# Patient Record
Sex: MALE | Race: WHITE | Employment: OTHER | URBAN - METROPOLITAN AREA
[De-identification: names, ages, dates, MRNs, and addresses within clinical notes are randomized per-mention and may not be internally consistent; named-entity substitution may affect disease eponyms.]

---

## 2017-10-11 ENCOUNTER — TRANSFERRED RECORDS (OUTPATIENT)
Dept: HEALTH INFORMATION MANAGEMENT | Facility: CLINIC | Age: 82
End: 2017-10-11

## 2017-10-11 ENCOUNTER — APPOINTMENT (OUTPATIENT)
Dept: CT IMAGING | Facility: CLINIC | Age: 82
End: 2017-10-11
Attending: EMERGENCY MEDICINE
Payer: MEDICARE

## 2017-10-11 ENCOUNTER — HOSPITAL ENCOUNTER (EMERGENCY)
Facility: CLINIC | Age: 82
Discharge: HOME | End: 2017-10-11
Attending: EMERGENCY MEDICINE | Admitting: EMERGENCY MEDICINE
Payer: MEDICARE

## 2017-10-11 VITALS
DIASTOLIC BLOOD PRESSURE: 95 MMHG | BODY MASS INDEX: 27.17 KG/M2 | TEMPERATURE: 98.1 F | WEIGHT: 205 LBS | HEART RATE: 65 BPM | OXYGEN SATURATION: 97 % | RESPIRATION RATE: 15 BRPM | HEIGHT: 73 IN | SYSTOLIC BLOOD PRESSURE: 122 MMHG

## 2017-10-11 DIAGNOSIS — R55 NEAR SYNCOPE: ICD-10-CM

## 2017-10-11 LAB
ALBUMIN UR-MCNC: ABNORMAL MG/DL
ALBUMIN UR-MCNC: NEGATIVE MG/DL
ANION GAP SERPL CALCULATED.3IONS-SCNC: 6 MMOL/L (ref 3–14)
APPEARANCE UR: ABNORMAL
APPEARANCE UR: NORMAL
BASOPHILS # BLD AUTO: 0 10E9/L (ref 0–0.2)
BASOPHILS NFR BLD AUTO: 0.5 %
BILIRUB UR QL STRIP: ABNORMAL
BILIRUB UR QL STRIP: NEGATIVE
BUN SERPL-MCNC: 21 MG/DL (ref 7–30)
CALCIUM SERPL-MCNC: 8.7 MG/DL (ref 8.5–10.1)
CHLORIDE SERPL-SCNC: 99 MMOL/L (ref 94–109)
CO2 SERPL-SCNC: 27 MMOL/L (ref 20–32)
COLOR UR AUTO: ABNORMAL
COLOR UR AUTO: YELLOW
CREAT SERPL-MCNC: 1.01 MG/DL (ref 0.66–1.25)
DIFFERENTIAL METHOD BLD: NORMAL
EOSINOPHIL # BLD AUTO: 0.1 10E9/L (ref 0–0.7)
EOSINOPHIL NFR BLD AUTO: 1.2 %
ERYTHROCYTE [DISTWIDTH] IN BLOOD BY AUTOMATED COUNT: 12.6 % (ref 10–15)
GFR SERPL CREATININE-BSD FRML MDRD: 71 ML/MIN/1.7M2
GLUCOSE SERPL-MCNC: 86 MG/DL (ref 70–99)
GLUCOSE UR STRIP-MCNC: ABNORMAL MG/DL
GLUCOSE UR STRIP-MCNC: NEGATIVE MG/DL
HCT VFR BLD AUTO: 43.7 % (ref 40–53)
HGB BLD-MCNC: 15.2 G/DL (ref 13.3–17.7)
HGB UR QL STRIP: ABNORMAL
HGB UR QL STRIP: NEGATIVE
IMM GRANULOCYTES # BLD: 0 10E9/L (ref 0–0.4)
IMM GRANULOCYTES NFR BLD: 0.5 %
INTERPRETATION ECG - MUSE: NORMAL
KETONES UR STRIP-MCNC: ABNORMAL MG/DL
KETONES UR STRIP-MCNC: NEGATIVE MG/DL
LEUKOCYTE ESTERASE UR QL STRIP: ABNORMAL
LEUKOCYTE ESTERASE UR QL STRIP: NEGATIVE
LYMPHOCYTES # BLD AUTO: 1.5 10E9/L (ref 0.8–5.3)
LYMPHOCYTES NFR BLD AUTO: 25.6 %
MCH RBC QN AUTO: 32.9 PG (ref 26.5–33)
MCHC RBC AUTO-ENTMCNC: 34.8 G/DL (ref 31.5–36.5)
MCV RBC AUTO: 95 FL (ref 78–100)
MONOCYTES # BLD AUTO: 0.8 10E9/L (ref 0–1.3)
MONOCYTES NFR BLD AUTO: 13.8 %
NEUTROPHILS # BLD AUTO: 3.4 10E9/L (ref 1.6–8.3)
NEUTROPHILS NFR BLD AUTO: 58.4 %
NITRATE UR QL: ABNORMAL
NITRATE UR QL: NEGATIVE
NRBC # BLD AUTO: 0 10*3/UL
NRBC BLD AUTO-RTO: 0 /100
PH UR STRIP: 6 PH (ref 5–7)
PH UR STRIP: ABNORMAL PH (ref 5–7)
PLATELET # BLD AUTO: 220 10E9/L (ref 150–450)
POTASSIUM SERPL-SCNC: 4.1 MMOL/L (ref 3.4–5.3)
RBC # BLD AUTO: 4.62 10E12/L (ref 4.4–5.9)
RBC #/AREA URNS AUTO: <1 /HPF (ref 0–2)
RBC #/AREA URNS AUTO: ABNORMAL /HPF (ref 0–2)
SODIUM SERPL-SCNC: 132 MMOL/L (ref 133–144)
SOURCE: ABNORMAL
SOURCE: NORMAL
SP GR UR STRIP: 1.01 (ref 1–1.03)
SP GR UR STRIP: ABNORMAL (ref 1–1.03)
TROPONIN I SERPL-MCNC: <0.015 UG/L (ref 0–0.04)
UROBILINOGEN UR STRIP-MCNC: 0 MG/DL (ref 0–2)
UROBILINOGEN UR STRIP-MCNC: ABNORMAL MG/DL (ref 0–2)
WBC # BLD AUTO: 5.9 10E9/L (ref 4–11)
WBC #/AREA URNS AUTO: 1 /HPF (ref 0–2)
WBC #/AREA URNS AUTO: ABNORMAL /HPF

## 2017-10-11 PROCEDURE — 93005 ELECTROCARDIOGRAM TRACING: CPT

## 2017-10-11 PROCEDURE — 96360 HYDRATION IV INFUSION INIT: CPT

## 2017-10-11 PROCEDURE — 84484 ASSAY OF TROPONIN QUANT: CPT | Performed by: EMERGENCY MEDICINE

## 2017-10-11 PROCEDURE — 85025 COMPLETE CBC W/AUTO DIFF WBC: CPT | Performed by: EMERGENCY MEDICINE

## 2017-10-11 PROCEDURE — 81001 URINALYSIS AUTO W/SCOPE: CPT | Performed by: EMERGENCY MEDICINE

## 2017-10-11 PROCEDURE — 80048 BASIC METABOLIC PNL TOTAL CA: CPT | Performed by: EMERGENCY MEDICINE

## 2017-10-11 PROCEDURE — 70450 CT HEAD/BRAIN W/O DYE: CPT

## 2017-10-11 PROCEDURE — 99285 EMERGENCY DEPT VISIT HI MDM: CPT | Mod: 25

## 2017-10-11 PROCEDURE — 25000128 H RX IP 250 OP 636: Performed by: EMERGENCY MEDICINE

## 2017-10-11 RX ADMIN — SODIUM CHLORIDE 1000 ML: 9 INJECTION, SOLUTION INTRAVENOUS at 12:30

## 2017-10-11 ASSESSMENT — ENCOUNTER SYMPTOMS
BACK PAIN: 0
LIGHT-HEADEDNESS: 1
VOMITING: 0
DIARRHEA: 0
PALPITATIONS: 0
ABDOMINAL PAIN: 0
FEVER: 0
SHORTNESS OF BREATH: 0

## 2017-10-11 NOTE — ED NOTES
Pt did well on the road test. Pt denied any dizziness or light headedness. Pt stated he felt slightly wobbly, but did well and gait appeared steady.

## 2017-10-11 NOTE — ED AVS SNAPSHOT
[facility] Emergency Department    [address]    [facility] [address]    Phone:  [phone]    Fax:  [phone]                                       Sudeep Field   MRN: [id number]    Department:  [facility] Emergency Department   Date of Visit:  10/11/2017           Patient Information     Date Of Birth          1935        Your diagnoses for this visit were:     Near syncope        You were seen by Silvana French MD.      Follow-up Information     Follow up with [facility] In 2 days.    Contact information:    [address]  [facility] [address]          Follow up with [facility] Emergency Department.    Specialty:  EMERGENCY MEDICINE    Why:  If symptoms worsen    Contact information:    [address]  [facility] [address] [phone]        Discharge Instructions       Return immediately with new or worsening symptoms.  Otherwise follow-up in the next couple of days with your primary care provider.    Discharge References/Attachments     NEAR SYNCOPE, UNKNOWN (ENGLISH)      24 Hour Appointment Hotline       To make an appointment at any [facility] clinic, call [phone] ([phone]). If you don't have a family doctor or clinic, we will help you find one. [facility] clinics are conveniently located to serve the needs of you and your family.             Review of your medicines      Our records show that you are taking the medicines listed below. If these are incorrect, please call your family doctor or clinic.        Dose / Directions Last dose taken    ASPIRIN PO   Dose:  81 mg        Take 81 mg by mouth daily   Refills:  0        LOSARTAN POTASSIUM PO        Take by mouth daily   Refills:  0        Multi-vitamin Tabs tablet   Dose:  1 tablet        Take 1 tablet by mouth daily   Refills:  0        RANITIDINE HCL PO        Take by mouth daily   Refills:  0        VITAMIN D (CHOLECALCIFEROL) PO         "Take by mouth daily   Refills:  0                Procedures and tests performed during your visit     Procedure/Test Number of Times Performed    Basic metabolic panel 1    CBC with platelets differential 1    CT Head w/o Contrast 1    Cardiac Continuous Monitoring 1    EKG 12 lead 1    Orthostatic blood pressure and pulse 1    Peripheral IV: Standard 1    Troponin I 1    UA with Microscopic 2      Orders Needing Specimen Collection     None      Pending Results     No orders found from 10/9/2017 to 10/12/2017.            Pending Culture Results     No orders found from 10/9/2017 to 10/12/2017.            Pending Results Instructions     If you had any lab results that were not finalized at the time of your Discharge, you can call the ED Lab Result RN at [phone]. You will be contacted by this team for any positive Lab results or changes in treatment. The nurses are available 7 days a week from 10A to 6:30P.  You can leave a message 24 hours per day and they will return your call.        Test Results From Your Hospital Stay        10/11/2017  2:03 PM      Narrative     CT SCAN OF THE HEAD WITHOUT CONTRAST  October 11, 2017 1:38 PM     HISTORY: Headaches, \"blacked out\" today.    TECHNIQUE: Axial images of the head and coronal reformations without  IV contrast material. Radiation dose for this scan was reduced using  automated exposure control, adjustment of the mA and/or kV according  to patient size, or iterative reconstruction technique.    COMPARISON: 6/18/2016.    FINDINGS: There is generalized atrophy of the brain. There is low  attenuation in the white matter of the cerebral hemispheres consistent  with sequelae of small vessel ischemic disease. There is no evidence  of intracranial hemorrhage, mass, acute infarct or anomaly.     The visualized portions of the sinuses and mastoids appear normal.  There is no evidence of trauma.         Impression     IMPRESSION:   1.  Atrophy of the brain.  White matter " changes consistent with  sequelae of small vessel ischemic disease. This is unchanged.  2.  No acute abnormality.    CARLITA TALBERT MD         10/11/2017  2:17 PM      Component Results     Component Value Ref Range & Units Status    Color Urine Yellow  Final    Appearance Urine Slightly Cloudy  Final    Glucose Urine Negative NEG^Negative mg/dL Final    Bilirubin Urine Negative NEG^Negative Final    Ketones Urine Negative NEG^Negative mg/dL Final    Specific Gravity Urine 1.009 1.003 - 1.035 Final    Blood Urine Negative NEG^Negative Final    pH Urine 6.0 5.0 - 7.0 pH Final    Protein Albumin Urine Negative NEG^Negative mg/dL Final    Urobilinogen mg/dL 0.0 0.0 - 2.0 mg/dL Final    Nitrite Urine Negative NEG^Negative Final    Leukocyte Esterase Urine Negative NEG^Negative Final    Source Midstream Urine  Final    WBC Urine 1 0 - 2 /HPF Final    RBC Urine <1 0 - 2 /HPF Final         10/11/2017 12:38 PM      Component Results     Component Value Ref Range & Units Status    Troponin I ES <0.015 0.000 - 0.045 ug/L Final    The 99th percentile for upper reference range is 0.045 ug/L.  Troponin values   in the range of 0.045 - 0.120 ug/L may be associated with risks of adverse   clinical events.           10/11/2017 12:38 PM      Component Results     Component Value Ref Range & Units Status    Sodium 132 (L) 133 - 144 mmol/L Final    Potassium 4.1 3.4 - 5.3 mmol/L Final    Chloride 99 94 - 109 mmol/L Final    Carbon Dioxide 27 20 - 32 mmol/L Final    Anion Gap 6 3 - 14 mmol/L Final    Glucose 86 70 - 99 mg/dL Final    Urea Nitrogen 21 7 - 30 mg/dL Final    Creatinine 1.01 0.66 - 1.25 mg/dL Final    GFR Estimate 71 >60 mL/min/1.7m2 Final    Non  GFR Calc    GFR Estimate If Black 86 >60 mL/min/1.7m2 Final    African American GFR Calc    Calcium 8.7 8.5 - 10.1 mg/dL Final         10/11/2017 12:20 PM      Component Results     Component Value Ref Range & Units Status    WBC 5.9 4.0 - 11.0 10e9/L Final    RBC  Count 4.62 4.4 - 5.9 10e12/L Final    Hemoglobin 15.2 13.3 - 17.7 g/dL Final    Hematocrit 43.7 40.0 - 53.0 % Final    MCV 95 78 - 100 fl Final    MCH 32.9 26.5 - 33.0 pg Final    MCHC 34.8 31.5 - 36.5 g/dL Final    RDW 12.6 10.0 - 15.0 % Final    Platelet Count 220 150 - 450 10e9/L Final    Diff Method Automated Method  Final    % Neutrophils 58.4 % Final    % Lymphocytes 25.6 % Final    % Monocytes 13.8 % Final    % Eosinophils 1.2 % Final    % Basophils 0.5 % Final    % Immature Granulocytes 0.5 % Final    Nucleated RBCs 0 0 /100 Final    Absolute Neutrophil 3.4 1.6 - 8.3 10e9/L Final    Absolute Lymphocytes 1.5 0.8 - 5.3 10e9/L Final    Absolute Monocytes 0.8 0.0 - 1.3 10e9/L Final    Absolute Eosinophils 0.1 0.0 - 0.7 10e9/L Final    Absolute Basophils 0.0 0.0 - 0.2 10e9/L Final    Abs Immature Granulocytes 0.0 0 - 0.4 10e9/L Final    Absolute Nucleated RBC 0.0  Final         10/11/2017  2:30 PM      Component Results     Component Value Ref Range & Units Status    Color Urine Canceled, Test credited  Final    Canceled:  Specimen improperly labeled.    Appearance Urine Canceled, Test credited  Final    Canceled:  Specimen improperly labeled.    Glucose Urine Canceled, Test credited (A) NEG^Negative mg/dL Final    Canceled:  Specimen improperly labeled.    Bilirubin Urine Canceled, Test credited (A) NEG^Negative Final    Canceled:  Specimen improperly labeled.    Ketones Urine Canceled, Test credited (A) NEG^Negative mg/dL Final    Canceled:  Specimen improperly labeled.    Specific Gravity Urine Canceled, Test credited 1.003 - 1.035 Final    Canceled:  Specimen improperly labeled.    Blood Urine Canceled, Test credited (A) NEG^Negative Final    Canceled:  Specimen improperly labeled.    pH Urine Canceled, Test credited 5.0 - 7.0 pH Final    Canceled:  Specimen improperly labeled.    Protein Albumin Urine Canceled, Test credited (A) NEG^Negative mg/dL Final    Canceled:  Specimen improperly labeled.     Urobilinogen mg/dL Canceled, Test credited 0.0 - 2.0 mg/dL Final    Canceled:  Specimen improperly labeled.    Nitrite Urine Canceled, Test credited (A) NEG^Negative Final    Canceled:  Specimen improperly labeled.    Leukocyte Esterase Urine Canceled, Test credited (A) NEG^Negative Final    Canceled:  Specimen improperly labeled.    Source Unspecified Urine  Final    WBC Urine Canceled, Test credited (A) OTO2^O - 2 /HPF Final    Canceled:  Specimen improperly labeled.    RBC Urine Canceled, Test credited 0 - 2 /HPF Final    Canceled:  Specimen improperly labeled.                Clinical Quality Measure: Blood Pressure Screening     Your blood pressure was checked while you were in the emergency department today. The last reading we obtained was  BP: 153/84 . Please read the guidelines below about what these numbers mean and what you should do about them.  If your systolic blood pressure (the top number) is less than 120 and your diastolic blood pressure (the bottom number) is less than 80, then your blood pressure is normal. There is nothing more that you need to do about it.  If your systolic blood pressure (the top number) is 120-139 or your diastolic blood pressure (the bottom number) is 80-89, your blood pressure may be higher than it should be. You should have your blood pressure rechecked within a year by a primary care provider.  If your systolic blood pressure (the top number) is 140 or greater or your diastolic blood pressure (the bottom number) is 90 or greater, you may have high blood pressure. High blood pressure is treatable, but if left untreated over time it can put you at risk for heart attack, stroke, or kidney failure. You should have your blood pressure rechecked by a primary care provider within the next 4 weeks.  If your provider in the emergency department today gave you specific instructions to follow-up with your doctor or provider even sooner than that, you should follow that instruction  "and not wait for up to 4 weeks for your follow-up visit.        Thank you for choosing [facility]       Thank you for choosing [facility] for your care. Our goal is always to provide you with excellent care. Hearing back from our patients is one way we can continue to improve our services. Please take a few minutes to complete the written survey that you may receive in the mail after you visit with us. Thank you!        [employer] Information     [employer] lets you send messages to your doctor, view your test results, renew your prescriptions, schedule appointments and more. To sign up, go to [email or web address] . Click on \"Log in\" on the left side of the screen, which will take you to the Welcome page. Then click on \"Sign up Now\" on the right side of the page.     You will be asked to enter the access code listed below, as well as some personal information. Please follow the directions to create your username and password.     Your access code is: [email or web address]  Expires: 2018  3:20 PM     Your access code will  in 90 days. If you need help or a new code, please call your [facility] clinic or [phone].        Care EveryWhere ID     This is your Care EveryWhere ID. This could be used by other organizations to access your [facility] medical records  [id number]        Equal Access to Services     DL YEH : Tish Ann, waaxda luqadaha, qaybta kaalmada rigo, sherron gallo . So [facility] [phone].    ATENCIÓN: Si habla español, tiene a romero disposición servicios gratuitos de asistencia lingüística. Llame al [phone].    We comply with applicable federal civil rights laws and [place] laws. We do not discriminate on the basis of race, color, national origin, age, disability, sex, sexual orientation, or gender identity.            After Visit Summary       This is your record. Keep this with you and show to your community pharmacist(s) and doctor(s) at your " next visit.

## 2017-10-11 NOTE — ED PROVIDER NOTES
"  History     Chief Complaint:  Near Syncope    The history is provided by the patient and a relative.      Sudeep Field is a 82 year old male with a history of hypertension who presents to the ED for evaluation of near syncope. Around 3 hours prior to arrival, he got lightheaded with some visual disturbance (\"blacked out\") so he sat down in a chair. He had immediate resolution of symptoms and was ambulatory afterwards. Patient did not have syncope/LOC and he did not fall. He denies chest pain, shortness of breath, or a headache before onset of symptoms. He went to the [facility], who referred him here. Of note, he had a syncopal episode a year ago, which was noted to be from dehydration and poor PO intake, though his son notes he has been eating regularly and has a normal intake of fluids. He has been having occasional headaches, typically localized to the front of his head. Also, he was recently taken off of Prilosec and put on ranitidine due to increasing \"memory problems.\" Here in the ED, he feels well. Denies symptoms since this episode. He denies any vomiting, diarrhea, fever, back or abdominal pain, shortness of breath, or palpitations.     Allergies:  NKDA    Medications:    Aspirin  Losartan  Ranitidine    Past Medical History:    Aortic aneurysm  HTN  Prostate cancer  Sleep apnea    Past Surgical History:    Hernia repair  Prostatectomy  Prostate surgery    Family History:    No past pertinent family history.    Social History:  Marital Status:    Lives with son  Negative for tobacco use.  Negative for alcohol use.    Review of Systems   Constitutional: Negative for fever.   Eyes: Positive for visual disturbance (resolved).   Respiratory: Negative for shortness of breath.    Cardiovascular: Negative for palpitations.   Gastrointestinal: Negative for abdominal pain, diarrhea and vomiting.   Musculoskeletal: Negative for back pain.   Neurological: Positive for light-headedness " "(resolved).   All other systems reviewed and are negative.      Physical Exam   First Vitals:  BP: (!) 162/93  Pulse: 65  Temp: 98.1  F (36.7  C)  Resp: 16  Height: 185.4 cm (6' 1\")  Weight: 93 kg (205 lb)  SpO2: 100 %    Physical Exam  General: Well-developed and well-nourished; well appearing elderly  man; cooperative  Head:  Atraumatic  Eyes:  PERRL. Extraocular movements intact; conjunctivae, lids, and sclerae are normal  ENT:    Normal nose; moist mucous membranes  Neck:  Supple; normal range of motion  CV:  Regular rate and rhythm; normal heart sounds with no murmurs, rubs, or gallops detected  Resp:  No respiratory distress; clear to auscultation bilaterally without decreased breath sounds, wheezing, rales, or rhonchi  GI:  Soft; non-distended; non-tender    MS:  Normal ROM; no bilateral lower extremity edema, no pronator drift. Lower extremity strength 5/5 equal bilaterally.  Skin:  Warm; non-diaphoretic; no pallor  Neuro: Awake; A&Ox3; normal strength  Psych:  Normal mood and affect; normal speech  Vitals reviewed.    Emergency Department Course   ECG:  Time: 1140  Rate 69 bpm. KS interval 182. QRS duration 106. QT/QTc 414/443.   Normal sinus rhythm. Normal ECG.  No acute ST changes.  No significant change as compared to prior, dated 06/18/16.    Imaging:  Radiographic findings were communicated with the patient who voiced understanding of the findings.  CT Head without contrast:   1.  Atrophy of the brain.  White matter changes consistent with  sequelae of small vessel ischemic disease. This is unchanged.  2.  No acute abnormality, as per radiology.    Laboratory:  CBC: WBC: 5.9, HGB: 15.2, PLT: 220  BMP:  (L), o/w WNL (Creatinine: 1.01)    Troponin: <0.015    UA with Microscopic: All WNL    Interventions:  1230 NS 1L IV    Emergency Department Course:  Nursing notes and vitals reviewed. 1155 I performed an exam of the patient as documented above.     IV inserted. Medicine administered as " "documented above. Blood drawn. This was sent to the lab for further testing, results above.    The patient provided a urine sample here in the emergency department. This was sent for laboratory testing, findings above.     EKG obtained in the ED, see results above.     The patient was sent for a Head CT while in the emergency department, findings above.     1430 I rechecked the patient and discussed the results of his workup thus far. He is doing well. After talking with his son, they decided he would like to be discharged.    1517 I reevaluated the patient and provided an update in regards to his ED course.  He is able to ambulate without lightheadedness.    Findings and plan explained to the patient and son. Patient discharged home with instructions regarding supportive care, medications, and reasons to return. The importance of close follow-up was reviewed.    I personally reviewed the laboratory results with the patient and son and answered all related questions prior to discharge.       Impression & Plan      Medical Decision Making:  Sudeep Field is a 82 year old male brought in by his son after a near syncopal episode this morning. The patient states he felt lightheaded, like he was going to \"black out.\" His lightheadedness was associated with some vision changes. These resolved once he sat down. He has had episodes like this in the past, per his son. He was admitted just over a year ago for a syncopal episode due to poor PO intake/dehydration. The patient has been symptom free since the initial episode of lightheadedness. He denies any other symptoms or recent illness. His exam is unremarkable.     UA is without evidence of infection. Troponin is negative. BMP and CBC are reassuring. EKG is similarly reassuring. CT head without acute pathology. Orthostatic BP and heart rate are negative.     The patient was monitored in the ED for greater than 3 hours without return of symptoms. He ambulated without " lightheadedness or unsteady gait.     I reevaluated the patient, and he states he is feeling well. His [place] syncope rule is negative. I did offer observation stay for near syncope, given he is 82 years old and there is no clear etiology. However, the patient and his son had discussed it and elect to be discharged and follow up with his PCP. I answered all of their questions, and they verbalized understanding. They agree to the return precautions and are amenable to discharge.    Diagnosis:    ICD-10-CM   1. Near syncope R55       Disposition:  Discharged to home with son.    I, Lisa Mulligan, am serving as a scribe on 10/11/2017 at 11:47 AM to personally document services performed by Silvana French MD based on my observations and the provider's statements to me.     Lisa Mulligan  10/11/2017   [facility] EMERGENCY DEPARTMENT       Silvana French MD  10/11/17 [id number]

## 2017-10-11 NOTE — DISCHARGE INSTRUCTIONS
Return immediately with new or worsening symptoms.  Otherwise follow-up in the next couple of days with your primary care provider.

## 2017-10-11 NOTE — ED NOTES
Pt returned from CT, pt denied any dizziness at this time, pt able to stand at bedside and use urinal without any problems, VSS, no s/sxs of acute distress noted currently, will continue to monitor.

## 2017-10-11 NOTE — ED AVS SNAPSHOT
[facility] Emergency Department    [address]    [facility] [address]    Phone:  [phone]    Fax:  [phone]                                       Sudeep Field   MRN: [id number]    Department:  [facility] Emergency Department   Date of Visit:  10/11/2017           After Visit Summary Signature Page     I have received my discharge instructions, and my questions have been answered. I have discussed any challenges I see with this plan with the nurse or doctor.    ..........................................................................................................................................  Patient/Patient Representative Signature      ..........................................................................................................................................  Patient Representative Print Name and Relationship to Patient    ..................................................               ................................................  Date                                            Time    ..........................................................................................................................................  Reviewed by Signature/Title    ...................................................              ..............................................  Date                                                            Time

## 2017-10-11 NOTE — ED NOTES
Pt's son states that pt blacked out for a few seconds. Pt states that he did not fall and that he sat down when things started going black. Pt asymptomatic at this time. Pt was sent here from [facility]. EKG done.

## 2018-02-02 ENCOUNTER — HOSPITAL ENCOUNTER (OUTPATIENT)
Facility: CLINIC | Age: 83
Setting detail: OBSERVATION
Discharge: HOME | End: 2018-02-03
Attending: EMERGENCY MEDICINE | Admitting: INTERNAL MEDICINE
Payer: MEDICARE

## 2018-02-02 ENCOUNTER — APPOINTMENT (OUTPATIENT)
Dept: CT IMAGING | Facility: CLINIC | Age: 83
End: 2018-02-02
Attending: EMERGENCY MEDICINE
Payer: MEDICARE

## 2018-02-02 DIAGNOSIS — K76.89 HEPATIC CYST: ICD-10-CM

## 2018-02-02 DIAGNOSIS — K59.00 CONSTIPATION, UNSPECIFIED CONSTIPATION TYPE: Primary | ICD-10-CM

## 2018-02-02 DIAGNOSIS — R55 SYNCOPE, UNSPECIFIED SYNCOPE TYPE: ICD-10-CM

## 2018-02-02 DIAGNOSIS — I71.40 ABDOMINAL AORTIC ANEURYSM (AAA) WITHOUT RUPTURE (H): ICD-10-CM

## 2018-02-02 DIAGNOSIS — R10.84 ABDOMINAL PAIN, GENERALIZED: ICD-10-CM

## 2018-02-02 PROBLEM — R10.9 ABDOMINAL PAIN: Status: ACTIVE | Noted: 2018-02-02

## 2018-02-02 LAB
ANION GAP SERPL CALCULATED.3IONS-SCNC: 8 MMOL/L (ref 3–14)
BUN SERPL-MCNC: 23 MG/DL (ref 7–30)
CALCIUM SERPL-MCNC: 8.7 MG/DL (ref 8.5–10.1)
CHLORIDE SERPL-SCNC: 101 MMOL/L (ref 94–109)
CO2 SERPL-SCNC: 27 MMOL/L (ref 20–32)
CREAT BLD-MCNC: 1.1 MG/DL (ref 0.66–1.25)
CREAT SERPL-MCNC: 1 MG/DL (ref 0.66–1.25)
ERYTHROCYTE [DISTWIDTH] IN BLOOD BY AUTOMATED COUNT: 12.4 % (ref 10–15)
GFR SERPL CREATININE-BSD FRML MDRD: 64 ML/MIN/1.7M2
GFR SERPL CREATININE-BSD FRML MDRD: 72 ML/MIN/1.7M2
GLUCOSE SERPL-MCNC: 95 MG/DL (ref 70–99)
HCT VFR BLD AUTO: 45.2 % (ref 40–53)
HGB BLD-MCNC: 15.4 G/DL (ref 13.3–17.7)
INTERPRETATION ECG - MUSE: NORMAL
LACTATE BLD-SCNC: 1.3 MMOL/L (ref 0.7–2)
LACTATE SERPL-SCNC: 1.3 MMOL/L (ref 0.4–2)
MCH RBC QN AUTO: 32.4 PG (ref 26.5–33)
MCHC RBC AUTO-ENTMCNC: 34.1 G/DL (ref 31.5–36.5)
MCV RBC AUTO: 95 FL (ref 78–100)
PLATELET # BLD AUTO: 243 10E9/L (ref 150–450)
POTASSIUM SERPL-SCNC: 4.3 MMOL/L (ref 3.4–5.3)
RBC # BLD AUTO: 4.76 10E12/L (ref 4.4–5.9)
SODIUM SERPL-SCNC: 136 MMOL/L (ref 133–144)
TROPONIN I SERPL-MCNC: <0.015 UG/L (ref 0–0.04)
WBC # BLD AUTO: 5.9 10E9/L (ref 4–11)

## 2018-02-02 PROCEDURE — 84484 ASSAY OF TROPONIN QUANT: CPT | Performed by: EMERGENCY MEDICINE

## 2018-02-02 PROCEDURE — 85027 COMPLETE CBC AUTOMATED: CPT | Performed by: EMERGENCY MEDICINE

## 2018-02-02 PROCEDURE — 82565 ASSAY OF CREATININE: CPT

## 2018-02-02 PROCEDURE — 74177 CT ABD & PELVIS W/CONTRAST: CPT

## 2018-02-02 PROCEDURE — 36415 COLL VENOUS BLD VENIPUNCTURE: CPT | Performed by: PHYSICIAN ASSISTANT

## 2018-02-02 PROCEDURE — 99285 EMERGENCY DEPT VISIT HI MDM: CPT | Mod: 25

## 2018-02-02 PROCEDURE — A9270 NON-COVERED ITEM OR SERVICE: HCPCS | Mod: GY | Performed by: PHYSICIAN ASSISTANT

## 2018-02-02 PROCEDURE — 99219 ZZC INITIAL OBSERVATION CARE,LEVL II: CPT | Performed by: PHYSICIAN ASSISTANT

## 2018-02-02 PROCEDURE — 25000128 H RX IP 250 OP 636: Performed by: EMERGENCY MEDICINE

## 2018-02-02 PROCEDURE — 80048 BASIC METABOLIC PNL TOTAL CA: CPT | Performed by: EMERGENCY MEDICINE

## 2018-02-02 PROCEDURE — 25000125 ZZHC RX 250: Performed by: PHYSICIAN ASSISTANT

## 2018-02-02 PROCEDURE — 70450 CT HEAD/BRAIN W/O DYE: CPT

## 2018-02-02 PROCEDURE — 83605 ASSAY OF LACTIC ACID: CPT | Performed by: EMERGENCY MEDICINE

## 2018-02-02 PROCEDURE — G0378 HOSPITAL OBSERVATION PER HR: HCPCS

## 2018-02-02 PROCEDURE — 25000132 ZZH RX MED GY IP 250 OP 250 PS 637: Mod: GY | Performed by: PHYSICIAN ASSISTANT

## 2018-02-02 PROCEDURE — 93005 ELECTROCARDIOGRAM TRACING: CPT

## 2018-02-02 PROCEDURE — 83605 ASSAY OF LACTIC ACID: CPT | Performed by: PHYSICIAN ASSISTANT

## 2018-02-02 PROCEDURE — 99207 ZZC CDG-MDM COMPONENT: MEETS LOW - DOWN CODED: CPT | Performed by: PHYSICIAN ASSISTANT

## 2018-02-02 RX ORDER — MAGNESIUM CARB/ALUMINUM HYDROX 105-160MG
296 TABLET,CHEWABLE ORAL ONCE
Status: COMPLETED | OUTPATIENT
Start: 2018-02-02 | End: 2018-02-02

## 2018-02-02 RX ORDER — LOSARTAN POTASSIUM 25 MG/1
25 TABLET ORAL AT BEDTIME
Status: DISCONTINUED | OUTPATIENT
Start: 2018-02-02 | End: 2018-02-03 | Stop reason: HOSPADM

## 2018-02-02 RX ORDER — ONDANSETRON 4 MG/1
4 TABLET, ORALLY DISINTEGRATING ORAL EVERY 6 HOURS PRN
Status: DISCONTINUED | OUTPATIENT
Start: 2018-02-02 | End: 2018-02-03 | Stop reason: HOSPADM

## 2018-02-02 RX ORDER — NALOXONE HYDROCHLORIDE 0.4 MG/ML
.1-.4 INJECTION, SOLUTION INTRAMUSCULAR; INTRAVENOUS; SUBCUTANEOUS
Status: DISCONTINUED | OUTPATIENT
Start: 2018-02-02 | End: 2018-02-03 | Stop reason: HOSPADM

## 2018-02-02 RX ORDER — POLYETHYLENE GLYCOL 3350 17 G/17G
17 POWDER, FOR SOLUTION ORAL DAILY PRN
Status: DISCONTINUED | OUTPATIENT
Start: 2018-02-02 | End: 2018-02-03 | Stop reason: HOSPADM

## 2018-02-02 RX ORDER — AMOXICILLIN 250 MG
2 CAPSULE ORAL 2 TIMES DAILY
Status: DISCONTINUED | OUTPATIENT
Start: 2018-02-02 | End: 2018-02-03 | Stop reason: HOSPADM

## 2018-02-02 RX ORDER — ASPIRIN 81 MG/1
81 TABLET, CHEWABLE ORAL DAILY
Status: DISCONTINUED | OUTPATIENT
Start: 2018-02-02 | End: 2018-02-03 | Stop reason: HOSPADM

## 2018-02-02 RX ORDER — ONDANSETRON 2 MG/ML
4 INJECTION INTRAMUSCULAR; INTRAVENOUS EVERY 6 HOURS PRN
Status: DISCONTINUED | OUTPATIENT
Start: 2018-02-02 | End: 2018-02-03 | Stop reason: HOSPADM

## 2018-02-02 RX ORDER — IOPAMIDOL 755 MG/ML
500 INJECTION, SOLUTION INTRAVASCULAR ONCE
Status: COMPLETED | OUTPATIENT
Start: 2018-02-02 | End: 2018-02-02

## 2018-02-02 RX ORDER — AMOXICILLIN 250 MG
1 CAPSULE ORAL 2 TIMES DAILY
Status: DISCONTINUED | OUTPATIENT
Start: 2018-02-02 | End: 2018-02-03 | Stop reason: HOSPADM

## 2018-02-02 RX ORDER — LACTULOSE 20 G/30ML
20 SOLUTION ORAL
Status: DISCONTINUED | OUTPATIENT
Start: 2018-02-02 | End: 2018-02-03 | Stop reason: HOSPADM

## 2018-02-02 RX ORDER — ACETAMINOPHEN 325 MG/1
650 TABLET ORAL EVERY 4 HOURS PRN
Status: DISCONTINUED | OUTPATIENT
Start: 2018-02-02 | End: 2018-02-03 | Stop reason: HOSPADM

## 2018-02-02 RX ADMIN — LACTULOSE 20 G: 20 SOLUTION ORAL at 17:27

## 2018-02-02 RX ADMIN — SENNOSIDES AND DOCUSATE SODIUM 1 TABLET: 8.6; 5 TABLET ORAL at 20:11

## 2018-02-02 RX ADMIN — SENNOSIDES AND DOCUSATE SODIUM 1 TABLET: 8.6; 5 TABLET ORAL at 13:38

## 2018-02-02 RX ADMIN — MAGESIUM CITRATE 296 ML: 1.75 LIQUID ORAL at 12:42

## 2018-02-02 RX ADMIN — SODIUM CHLORIDE 60 ML: 9 INJECTION, SOLUTION INTRAVENOUS at 06:14

## 2018-02-02 RX ADMIN — LOSARTAN POTASSIUM 25 MG: 25 TABLET ORAL at 20:10

## 2018-02-02 RX ADMIN — ASPIRIN 81 MG 81 MG: 81 TABLET ORAL at 13:38

## 2018-02-02 RX ADMIN — IOPAMIDOL 75 ML: 755 INJECTION, SOLUTION INTRAVENOUS at 06:14

## 2018-02-02 ASSESSMENT — ENCOUNTER SYMPTOMS
CHEST TIGHTNESS: 0
DIZZINESS: 1
VOMITING: 0
PALPITATIONS: 0
ABDOMINAL PAIN: 1
DIARRHEA: 0

## 2018-02-02 NOTE — IP AVS SNAPSHOT
[facility] Observation Department    [address]    [facility] [address]    Phone:  [phone]                                       After Visit Summary   2/2/2018    Sudeep Field    MRN: [id number]           After Visit Summary Signature Page     I have received my discharge instructions, and my questions have been answered. I have discussed any challenges I see with this plan with the nurse or doctor.    ..........................................................................................................................................  Patient/Patient Representative Signature      ..........................................................................................................................................  Patient Representative Print Name and Relationship to Patient    ..................................................               ................................................  Date                                            Time    ..........................................................................................................................................  Reviewed by Signature/Title    ...................................................              ..............................................  Date                                                            Time

## 2018-02-02 NOTE — PLAN OF CARE
Problem: Patient Care Overview  Goal: Plan of Care/Patient Progress Review  Outcome: No Change  PRIMARY DIAGNOSIS: ACUTE ABD PAIN  OUTPATIENT/OBSERVATION GOALS TO BE MET BEFORE DISCHARGE:  1. Pain Status: Pain free.    2. Return to near baseline physical activity: Yes    3. Cleared for discharge by consultants (if involved): N/A    Discharge Planner Nurse   Safe discharge environment identified: Yes  Barriers to discharge: Yes, pending medical clearance       Entered by: Marcelo Melo 02/02/2018 1:06 PM     Please review provider order for any additional goals.   Nurse to notify provider when observation goals have been met and patient is ready for discharge.    Pt A&O but forgetful. Currently denies pain. Up with SBA d/t Hx of syncope, orthos in ED negative. Drank mag citrate. Waiting for BM. Possible d/c this evening if able to clear bowels.

## 2018-02-02 NOTE — PHARMACY-ADMISSION MEDICATION HISTORY
Admission medication history interview status for this patient is complete. See [facility] admission navigator for allergy information, prior to admission medications and immunization status.     Medication history interview source(s):Patient and family  Medication history resources (including written lists, pill bottles, clinic record):None  Primary pharmacy:[facility]    Changes made to PTA medication list:  Added:   Deleted: ranitidine  Changed: Doses to Vitamin D and Losartan    Actions taken by pharmacist (provider contacted, etc):None     Additional medication history information:None    Medication reconciliation/reorder completed by provider prior to medication history? No    Do you take OTC medications (eg tylenol, ibuprofen, fish oil, eye/ear drops, etc)? Y  (Y/N)    For patients on insulin therapy: N (Y/N)      Prior to Admission medications    Medication Sig Last Dose Taking? Auth Provider   LOSARTAN POTASSIUM PO Take 25 mg by mouth At Bedtime  2/1/2018 at Unknown time Yes Reported, Patient   VITAMIN D, CHOLECALCIFEROL, PO Take 2,000 Units by mouth daily  2/1/2018 at Unknown time Yes Reported, Patient   ASPIRIN PO Take 81 mg by mouth daily  2/1/2018 at Unknown time Yes Reported, Patient   multivitamin, therapeutic with minerals (MULTI-VITAMIN) TABS Take 1 tablet by mouth daily 2/1/2018 at Unknown time Yes Reported, Patient

## 2018-02-02 NOTE — PLAN OF CARE
Problem: Patient Care Overview  Goal: Plan of Care/Patient Progress Review  Outcome: No Change  ROOM # 223    Living Situation (if not independent, order SW consult): Home  Facility name:  : [facility], daughter    Activity level at baseline: Independent  Activity level on admit: SBA      Patient registered to observation; given Patient Bill of Rights; given the opportunity to ask questions about observation status and their plan of care.  Patient has been oriented to the observation room, bathroom and call light is in place.    Discussed discharge goals and expectations with patient/family.

## 2018-02-02 NOTE — H&P
"81 yo with hx of dementia, HTN, chronic constipation and AAA who presents with acute sharp left mid abd pain followed by near syncope due to the pain.   Brought in by ED, CT head negative, labs unremarkable, CT abd pelvis shows: stable AAA at 3.8 cm. No dissection. +cholelithiasis But there was mention of \"  There is rotation about the mesentery in the lower abdomen and there is a small amount of mesenteric edema. No bowel volvulus.\" There is no bowel obstruction or inflammation. The appendix is normal. Moderate  amount of stool throughout the colon. No obstruction. No free, intraperitoneal gas or fluid. There is degenerative disease in the spine.    CRS was curbsided in ED and recommended observation for abd pain and given significant stool burden, recommend bowel clearance as it is a possible for volvulus.   Admit to  OBS  Mag citrate now  Senokot BID and PRN miralax.   Serial abd exam.   Likely d/c tomorrow if sxs. Improves.    History and Physical     Sudeep Field MRN# [id number]   YOB: 1935 Age: 82 year old      Date of Admission:  2/2/2018    Primary care provider: [facility]          Assessment and Plan:   Sudeep Field is a 82 year old male with a PMH significant for dementia, HTN, AAA, QAMAR, prostate ca, who presents with episode of sharp left sided abd pain followed by near syncopal episode. Work up in the ED showed some concerns for possible \"rotation about the mesentery in the lower abdomen and there is a small amount of mesenteric edema. No bowel volvulus.\" Otherwise the rest of his CT c/a/p was unremarkable. Labs unremarkable. CRS was consulted in ED, they recommended overnight observation for pain as well as bowel regiment given heavy stool burden and it's a risk factor for possible volvulus.     1. Abd pain in the setting of constipation and ? Transient bowel volvulus: pain free at present. He does carry a dx of chronic constipation. Main concern was this questionable " "rotation about the mesentery in the lower abd, though there is no evidence of bowel volvulus. He is distended but soft and has a benign exam. He does have significant stool burden. Will admit for bowel regiment with Mag Citrate x1 now. Cont Senekot and PRN Miralax.   -If no BM after Mag Citrate, then start Lactulose 20g Q2 hrs until +BM then stop.  -Will check Lactic acid for completeness sake to r/o possible bwel ischemia    2. HTN: BP elevated initially 2/2 pain, but now improved.Cont Losartan.   3. Hx of AAA: size is stable. No sxs of dissection. Cont to monitor as outpt.   4. QAMAR: does not use CPAP. Cont to monitor.   5. DVT prophylaxis: cont scds, encourage ambulation  6. Admit as obs, likely be able to d/c tomorrow.                 Chief Complaint:   abd pain         History of Present Illness:   81 yo with hx of dementia, HTN, chronic constipation and AAA who presents with acute sharp left mid abd pain followed by near syncope due to the pain. He states he woke up this am and upon getting OOB, he had intense sharp left lower abd pain. This was quite painful but he was able to walk to the bathroom. He urinated and then walked back and was again c/o left abd pain and the pain was so intense that he almost passed out. He flopped to the bed and his son (who lives with him) came to check on him and then brought him to the ED.   No CP, SOB,  No recent illness, no n/v. Last BM yesterday, last c-scope 2014 at [facility] was negative. He dose have dx of chronic constipation but does not take regular bowel regiment. No hx of volvulus in the past.   Work up in ED shows negative CT head. Labs unremarkable. Pain improved in the ED.   CT c/a/p showed \"stable AAA at 3.8 cm. No dissection. +cholelithiasis But there was mention of \"  There is rotation about the mesentery in the lower abdomen and there is a small amount of mesenteric edema. No bowel volvulus.\" There is no bowel obstruction or inflammation. The appendix is normal. " "Moderate  amount of stool throughout the colon. No obstruction. No free, intraperitoneal gas or fluid. There is degenerative disease in the spine.\"   CRS was contacted regarding the possible mesentery \"rotation\" they recommended overnight observation for pain as well as bowel regiment given heavy stool burden and it's a risk factor for possible volvulus               Past Medical History:     Past Medical History:   Diagnosis Date     Aortic aneurysm (H)      Gastroesophageal reflux disease      Hypertension      IBS (irritable bowel syndrome)      Prostate cancer (H)      Sleep apnea                Past Surgical History:     Past Surgical History:   Procedure Laterality Date     HERNIA REPAIR       LAPAROSCOPIC PROSTATECTOMY       PROSTATE SURGERY                 Social History:     Social History     Social History     Marital status:      Spouse name: N/A     Number of children: N/A     Years of education: N/A     Occupational History     Not on file.     Social History Main Topics     Smoking status: Never Smoker     Smokeless tobacco: Not on file     Alcohol use No     Drug use: No     Sexual activity: No     Other Topics Concern     Not on file     Social History Narrative               Family History:   Reviewed and non contriubtory         Allergies:    No Known Allergies            Medications:     Prior to Admission medications    Medication Sig Last Dose Taking? Auth Provider   LOSARTAN POTASSIUM PO Take 25 mg by mouth At Bedtime  2/1/2018 at Unknown time Yes Reported, Patient   VITAMIN D, CHOLECALCIFEROL, PO Take 2,000 Units by mouth daily  2/1/2018 at Unknown time Yes Reported, Patient   ASPIRIN PO Take 81 mg by mouth daily  2/1/2018 at Unknown time Yes Reported, Patient   multivitamin, therapeutic with minerals (MULTI-VITAMIN) TABS Take 1 tablet by mouth daily 2/1/2018 at Unknown time Yes Reported, Patient              Review of Systems:   A Comprehensive greater than 10 system review of systems " "was carried out.  Pertinent positives and negatives are noted above.  Otherwise negative for contributory information.            Physical Exam:   Blood pressure 156/78, pulse 72, temperature 98.4  F (36.9  C), temperature source Oral, resp. rate 16, height 1.854 m (6' 1\"), weight 100.5 kg (221 lb 8 oz), SpO2 95 %.  Exam:  GENERAL:  Comfortable.  PSYCH: pleasant, oriented, No acute distress.  HEENT:  PERRLA. Normal conjunctiva, normal hearing, nasal mucosa and Oropharynx are normal.  NECK:  Supple, no neck vein distention, adenopathy or bruits, normal thyroid.  HEART:  Normal S1, S2 with no murmur, no pericardial rub, gallops or S3 or S4.  LUNGS:  Clear to auscultation, normal Respiratory effort. No wheezing, rales or ronchi.  ABDOMEN:  Distended but Soft, no hepatosplenomegaly, umbilical hernia no strangulation, normal bowel sounds. Non-tender.   EXTREMITIES:  No pedal edema, +2 pulses bilateral and equal.  SKIN:  Dry to touch, No rash, wound or ulcerations.  NEUROLOGIC:  CN 2-12 intact, BL 5/5 symmetric upper and lower extremity strength, sensation is intact with no focal deficits.           Data:       Recent Labs  Lab 02/02/18  0520   WBC 5.9   HGB 15.4   HCT 45.2   MCV 95          Recent Labs  Lab 02/02/18  0528 02/02/18  0520   NA  --  136   POTASSIUM  --  4.3   CHLORIDE  --  101   CO2  --  27   ANIONGAP  --  8   GLC  --  95   BUN  --  23   CR  --  1.00   GFRESTIMATED 64 72   GFRESTBLACK 78 87   KINGSLEY  --  8.7       Recent Labs  Lab 02/02/18  0520   TROPI <0.015         Results for orders placed or performed during the hospital encounter of 02/02/18   CT Chest/Abdomen/Pelvis w Contrast    Narrative    CT CHEST/ABDOMEN/PELVIS W CONTRAST  2/2/2018 6:28 AM    HISTORY:  Syncope, abdominal pain, known aortic aneurysm, ?dissection.      TECHNIQUE: CT scan obtained of the chest, abdomen, and pelvis with  oral and IV contrast.  75mL [id number] injected. Radiation dose for  this scan was reduced using automated " exposure control, adjustment of  the mA and/or kV according to patient size, or iterative  reconstruction technique.    COMPARISON:  None.    FINDINGS:  Chest: There is no aortic aneurysm or dissection. There are coronary  artery atherosclerotic calcifications. Heart size is normal. No  mediastinal, hilar or axillary lymph node enlargement. There is  dependent atelectasis bilaterally. No pneumothorax or pleural  effusion.     ABDOMEN: There are several hepatic cysts. There are stones in the  gallbladder. The spleen, pancreas, adrenal glands and right kidney are  normal in appearance. There is a cyst at the lower pole of the left  kidney. There is no abdominal or pelvic lymph node enlargement. There  is atherosclerotic calcification of the aorta and its branches. There  is a distal abdominal aortic aneurysm measuring 3.8 cm AP. No aortic  dissection.    Pelvis: There is no bowel obstruction or inflammation. The appendix is  normal. Moderate  amount of stool throughout the colon. There is  rotation about the mesentery in the lower abdomen and there is a small  amount of mesenteric edema. No bowel volvulus. No obstruction. No free  intraperitoneal gas or fluid. There is degenerative disease in the  spine.      Impression    IMPRESSION:  1. There is a 3.8 cm abdominal aortic aneurysm. No aortic dissection.  2. Coronary artery atherosclerotic calcifications.  3. Cholelithiasis.  4. Small amount of fluid in the mesentery of the midabdomen without  focal inflammation. No bowel obstruction or inflammation.   Head CT w/o contrast    Narrative    CT SCAN OF THE HEAD WITHOUT CONTRAST   2/2/2018 6:26 AM     HISTORY: Syncope.     TECHNIQUE:  Axial images of the head and coronal reformations without  IV contrast material. Radiation dose for this scan was reduced using  automated exposure control, adjustment of the mA and/or kV according  to patient size, or iterative reconstruction technique.    COMPARISON:  10/11/2017.    FINDINGS:  There is generalized atrophy of the brain.  There is low  attenuation in the white matter of the cerebral hemispheres consistent  with sequelae of small vessel ischemic disease. There is no evidence  of intracranial hemorrhage, mass, acute infarct or anomaly.     The visualized portions of the sinuses and mastoids appear normal.  There is no evidence of trauma.      Impression    IMPRESSION: No acute abnormality.             Bethany Pierce PA-C

## 2018-02-02 NOTE — IP AVS SNAPSHOT
MRN:[id number]                      After Visit Summary   2/2/2018    Sudeep Field    MRN: [id number]           Thank you!     Thank you for choosing [facility] for your care. Our goal is always to provide you with excellent care. Hearing back from our patients is one way we can continue to improve our services. Please take a few minutes to complete the written survey that you may receive in the mail after you visit. If you would like to speak to someone directly about your visit please contact Patient Relations at [phone]. Thank you!          Patient Information     Date Of Birth          1935        About your hospital stay     You were admitted on:  February 2, 2018 You last received care in the:  [facility] Observation Department    You were discharged on:  February 3, 2018        Reason for your hospital stay       You were registered to the observation unit for abdominal pain. CT scanning showed constipation with some rotation and swelling around the mesentery, though to be associated with the constipation. You were given medication to induce stool and your symptoms improved.                  Who to Call     For medical emergencies, please call 911.  For non-urgent questions about your medical care, please call your primary care provider or clinic, None          Attending Provider     Provider Specialty    Geronimo Mcnally MD Emergency Medicine    Lum, Kristan Stringer MD Emergency Medicine    Osman, Franco GILBERT,  Internal Medicine       Primary Care Provider Fax #    [facility] [phone]      After Care Instructions     Activity       Your activity upon discharge: activity as tolerated            Diet       Follow this diet upon discharge: regular, increase fiber                  Follow-up Appointments     Follow-up and recommended labs and tests        1. Start taking over the counter probiotics 1-2 times a day (any generic brand  "is okay).  2. Take Miralax over the counter as needed for constipation. Ideally you will have at least 1-2 stools daily.   3. You can also take over the counter FiberCon for fiber supplementation.   4. Follow up with your family doctor in 1 week for recheck.                             Pending Results     No orders found for last 3 day(s).            Statement of Approval     Ordered          18 0856  I have reviewed and agree with all the recommendations and orders detailed in this document.  EFFECTIVE NOW     Approved and electronically signed by:  Lisa Cook PA-C             Admission Information     Date & Time Provider Department Dept. Phone    2018 Franco Brewer,  [facility] Observation Department [phone]      Your Vitals Were     Blood Pressure Pulse Temperature Respirations Height Weight    155/82 (BP Location: Left arm) 58 97.7  F (36.5  C) (Oral) 16 1.854 m (6' 1\") 100.5 kg (221 lb 8 oz)    Pulse Oximetry BMI (Body Mass Index)                96% 29.22 kg/m2          [employer] Information     [employer] lets you send messages to your doctor, view your test results, renew your prescriptions, schedule appointments and more. To sign up, go to [email or web address] . Click on \"Log in\" on the left side of the screen, which will take you to the Welcome page. Then click on \"Sign up Now\" on the right side of the page.     You will be asked to enter the access code listed below, as well as some personal information. Please follow the directions to create your username and password.     Your access code is: [email or web address]  Expires: 2018 10:49 AM     Your access code will  in 90 days. If you need help or a new code, please call your [facility] clinic or [phone].        Care EveryWhere ID     This is your Care EveryWhere ID. This could be used by other organizations to access your [facility] medical records  [id number]        Equal Access to Services     [facility] " GAAR : Hadii aad marie avi Ann, waaxda luqadaha, qaybta kaalmada adelittle, sherron bautista hayrafael parkstarloc hunt. So [facility] [phone].    ATENCIÓN: Si habla español, tiene a romero disposición servicios gratuitos de asistencia lingüística. Llame al [phone].    We comply with applicable federal civil rights laws and [place] laws. We do not discriminate on the basis of race, color, national origin, age, disability, sex, sexual orientation, or gender identity.               Review of your medicines      START taking        Dose / Directions    polyethylene glycol Packet   Commonly known as:  MIRALAX/GLYCOLAX   Used for:  Constipation, unspecified constipation type        Dose:  17 g   Take 17 g by mouth daily as needed for constipation   Quantity:  7 packet   Refills:  0         CONTINUE these medicines which have NOT CHANGED        Dose / Directions    ASPIRIN PO        Dose:  81 mg   Take 81 mg by mouth daily   Refills:  0       LOSARTAN POTASSIUM PO        Dose:  25 mg   Take 25 mg by mouth At Bedtime   Refills:  0       Multi-vitamin Tabs tablet        Dose:  1 tablet   Take 1 tablet by mouth daily   Refills:  0       VITAMIN D (CHOLECALCIFEROL) PO        Dose:  2000 Units   Take 2,000 Units by mouth daily   Refills:  0            Where to get your medicines      Some of these will need a paper prescription and others can be bought over the counter. Ask your nurse if you have questions.     You don't need a prescription for these medications     polyethylene glycol Packet                Protect others around you: Learn how to safely use, store and throw away your medicines at www.disposemymeds.org.             Medication List: This is a list of all your medications and when to take them. Check marks below indicate your daily home schedule. Keep this list as a reference.      Medications           Morning Afternoon Evening Bedtime As Needed    ASPIRIN PO   Take 81 mg by mouth daily   Last time this  was given:  81 mg on 2/3/2018  9:41 AM   Next Dose Due:  Tomorrow 2/4 in the morning                                   LOSARTAN POTASSIUM PO   Take 25 mg by mouth At Bedtime   Last time this was given:  25 mg on 2/2/2018  8:10 PM   Next Dose Due:  Today 2/3 at bedtime                                   Multi-vitamin Tabs tablet   Take 1 tablet by mouth daily   Next Dose Due:  Did not take during hospital stay. May take as directed                                polyethylene glycol Packet   Commonly known as:  MIRALAX/GLYCOLAX   Take 17 g by mouth daily as needed for constipation   Next Dose Due:  Did not take during hospital stay. May take as directed                                VITAMIN D (CHOLECALCIFEROL) PO   Take 2,000 Units by mouth daily   Next Dose Due:  Did not take during hospital stay. May take as directed                                          More Information        Polyethylene Glycol powder  Brand Names: GaviLax, GIALAX, GlycoLax, MiraLax, Smooth LAX, [facility]  What is this medicine?  POLYETHYLENE GLYCOL 3350 (radha ee ETH i marguerite; GLYE col) powder is a laxative used to treat constipation. It increases the amount of water in the stool. Bowel movements become easier and more frequent.  How should I use this medicine?  Take this medicine by mouth. The bottle has a measuring cap that is marked with a line. Pour the powder into the cap up to the marked line (the dose is about 1 heaping tablespoon). Add the powder in the cap to a full glass (4 to 8 ounces or 120 to 240 ml) of water, juice, soda, coffee or tea. Mix the powder well. Drink the solution. Take exactly as directed. Do not take your medicine more often than directed.  Talk to your pediatrician regarding the use of this medicine in children. Special care may be needed.  What side effects may I notice from receiving this medicine?  Side effects that you should report to your doctor or health care professional as soon as  possible:    diarrhea    difficulty breathing    itching of the skin, hives, or skin rash    severe bloating, pain, or distension of the stomach    vomiting  Side effects that usually do not require medical attention (report to your doctor or health care professional if they continue or are bothersome):    bloating or gas    lower abdominal discomfort or cramps    nausea  What may interact with this medicine?  Interactions are not expected.  What if I miss a dose?  If you miss a dose, take it as soon as you can. If it is almost time for your next dose, take only that dose. Do not take double or extra doses.  Where should I keep my medicine?  Keep out of the reach of children.  Store between 15 and 30 degrees C (59 and 86 degrees F). Throw away any unused medicine after the expiration date.  What should I tell my health care provider before I take this medicine?  They need to know if you have any of these conditions:    a history of blockage of the stomach or intestine    current abdomen distension or pain    difficulty swallowing    diverticulitis, ulcerative colitis, or other chronic bowel disease    phenylketonuria    an unusual or allergic reaction to polyethylene glycol, other medicines, dyes, or preservatives    pregnant or trying to get pregnant    breast-feeding  What should I watch for while using this medicine?  Do not use for more than 2 weeks without advice from your doctor or health care professional. It can take 2 to 4 days to have a bowel movement and to experience improvement in constipation. See your health care professional for any changes in bowel habits, including constipation, that are severe or last longer than three weeks.  Always take this medicine with plenty of water.  NOTE:This sheet is a summary. It may not cover all possible information. If you have questions about this medicine, talk to your doctor, pharmacist, or health care provider. Copyright  2017 [employer]  Sodium, Sennosides Oral tablet  What is this medicine?  DOCUSATE SODIUM; SENNA (doc CUE sayt EMERALD phan um; SEN na) contains a stool softener and a laxative. It is used to treat constipation.  This medicine may be used for other purposes; ask your health care provider or pharmacist if you have questions.  What should I tell my health care provider before I take this medicine?  They need to know if you have any of these conditions:    nausea or vomiting    severe constipation    stomach pain    sudden change in bowel habit lasting more than 2 weeks    an unusual or allergic reaction to docusate, senna, other medicines, foods, dyes, or preservatives    pregnant or trying to get pregnant    breast-feeding  How should I use this medicine?  Take this medicine by mouth with a full glass of water. Follow the directions on the label. Take your doses at regular intervals. Do not take your medicine more often than directed.  Talk to your pediatrician regarding the use of this medicine in children. While this medicine may be prescribed for children as young as 2 years for selected conditions, precautions do apply.  Overdosage: If you think you have taken too much of this medicine contact a poison control center or emergency room at once.  NOTE: This medicine is only for you. Do not share this medicine with others.  What if I miss a dose?  If you miss a dose, take it as soon as you can. If it is almost time for your next dose, take only that dose. Do not take double or extra doses.  What may interact with this medicine?    mineral oil  This list may not describe all possible interactions. Give your health care provider a list of all the medicines, herbs, non-prescription drugs, or dietary supplements you use. Also tell them if you smoke, drink alcohol, or use illegal drugs. Some items may interact with your medicine.  What should I watch for while using this medicine?  Do not use for more than one week without advice from your  doctor or health care professional. Long-term use can make your body depend on the laxative for regular bowel movements, damage the bowel, cause malnutrition, and problems with the amounts of water and salts in your body. If your constipation keeps returning, check with your doctor or health care professional.  Drink plenty of water while taking this medicine. This will help fight constipation.  Stop using this medicine and contact your doctor or health care professional if you experience any rectal bleeding or do not have a bowel movement after use. These could be signs of a more serious condition.  What side effects may I notice from receiving this medicine?  Side effects that you should report to your doctor or health care professional as soon as possible:    allergic reactions like skin rash, itching or hives, swelling of the face, lips, or tongue    muscle weakness    unusually weak or tired    unusual weight loss  Side effects that usually do not require medical attention (report to your doctor or health care professional if they continue or are bothersome):    diarrhea    discolored urine    nausea, vomiting    stomach cramps    throat irritation  This list may not describe all possible side effects. Call your doctor for medical advice about side effects. You may report side effects to FDA at 1-082-FDA-2787.  Where should I keep my medicine?  Keep out of the reach of children.  Store at room temperature between 15 and 30 degrees C (59 and 86 degrees F). Throw away any unused medicine after the expiration date.  NOTE:This sheet is a summary. It may not cover all possible information. If you have questions about this medicine, talk to your doctor, pharmacist, or health care provider. Copyright  2016 [employer]                Senna tablets or capsules  Brand Names: Black aught, Ex-Lax, Perdiem, Senexon, Senna, SennaGen, Senna-Lax, Senna-Tabs, Senna-Time, Sennatural, Senokot, Senokot Extra Strength, SenoSol,  Uni-Cenna  What is this medicine?  SENNA (SEN uh) is a laxative. This medicine is used to relieve constipation. It may also be used to empty and prepare the bowel for surgery or examination.  How should I use this medicine?  Take this medicine by mouth with a full glass of water. Follow the directions on the package. Always take with plenty of water. Take exactly as directed. Do not take your medicine more often than directed.  Talk to your pediatrician regarding the use of this medicine in children. While this medicine may be prescribed for children as young as 2 years for selected conditions, precautions do apply.  What side effects may I notice from receiving this medicine?  Side effects that you should report to your doctor or health care professional as soon as possible:    diarrhea    muscle weakness    nausea, vomiting    unusual weight loss  Side effects that usually do not require medical attention (report to your doctor or health care professional if they continue or are bothersome):    bloating    discolored urine    lower stomach discomfort or cramps  What may interact with this medicine?  Interactions are not expected. However, this medicine may affect the time other medicines stay in the stomach. It is best not to take this medicine within 1 to 2 hours of taking other medicines.  What if I miss a dose?  If you miss a dose, take it as soon as you can. If it is almost time for your next dose, take only that dose. Do not take double or extra doses.  Where should I keep my medicine?  Keep out of the reach of children.  Store at room temperature between 15 and 30 degrees C (59 and 86 degrees F). Keep container tightly closed. Throw away any unused medicine after the expiration date.  What should I tell my health care provider before I take this medicine?  They need to know if you have any of these conditions    appendicitis    stomach pain or blockage    vomiting    an unusual or allergic reaction to senna,  other medicines, foods, dyes, or preservatives    pregnant or trying to get pregnant    breast-feeding  What should I watch for while using this medicine?  Do not use this medicine for longer than directed by your doctor or health care professional. This medicine can be habit-forming. Long-term use can make your body depend on the laxative for regular bowel movements, damage the bowel, cause malnutrition, and problems with the amounts of water and salts in your body. If your constipation keeps returning, check with your doctor or health care professional.  NOTE:This sheet is a summary. It may not cover all possible information. If you have questions about this medicine, talk to your doctor, pharmacist, or health care provider. Copyright  2017 [employer]                Docusate capsules  Brand Names: Colace, Colace Clear, Correctol, D.O.S., DC, Doc-Q-Lace, DocuLace, Docusoft S, DOK, DOK Extra Strength, Dulcolax, Genasoft, Kaopectate Liqui-Gels, Jaren-Tin, Krishna Stool Softener, Stool Softener, Stool Softner DC, Surfak, Thea-Q-Lax, Uni-Ease  What is this medicine?  DOCUSATE (doc CUE sayt) is stool softener. It helps prevent constipation and straining or discomfort associated with hard or dry stools.  How should I use this medicine?  Take this medicine by mouth with a glass of water. Follow the directions on the label. Take your doses at regular intervals. Do not take your medicine more often than directed.  Talk to your pediatrician regarding the use of this medicine in children. While this medicine may be prescribed for children as young as 2 years for selected conditions, precautions do apply.  What side effects may I notice from receiving this medicine?  Side effects that you should report to your doctor or health care professional as soon as possible:    allergic reactions like skin rash, itching or hives, swelling of the face, lips, or tongue  Side effects that usually do not require medical attention (report to  your doctor or health care professional if they continue or are bothersome):    diarrhea    stomach cramps    throat irritation  What may interact with this medicine?    mineral oil  What if I miss a dose?  If you miss a dose, take it as soon as you can. If it is almost time for your next dose, take only that dose. Do not take double or extra doses.  Where should I keep my medicine?  Keep out of the reach of children.  Store at room temperature between 15 and 30 degrees C (59 and 86 degrees F). Throw away any unused medicine after the expiration date.  What should I tell my health care provider before I take this medicine?  They need to know if you have any of these conditions:    nausea or vomiting    severe constipation    stomach pain    sudden change in bowel habit lasting more than 2 weeks    an unusual or allergic reaction to docusate, other medicines, foods, dyes, or preservatives    pregnant or trying to get pregnant    breast-feeding  What should I watch for while using this medicine?  Do not use for more than one week without advice from your doctor or health care professional. If your constipation returns, check with your doctor or health care professional.  Drink plenty of water while taking this medicine. Drinking water helps decrease constipation.  Stop using this medicine and contact your doctor or health care professional if you experience any rectal bleeding or do not have a bowel movement after use. These could be signs of a more serious condition.  NOTE:This sheet is a summary. It may not cover all possible information. If you have questions about this medicine, talk to your doctor, pharmacist, or health care provider. Copyright  2017 [employer]

## 2018-02-02 NOTE — ED NOTES
Pt reports standing from bed, getting dizzy and having a syncopal and falling onto the bed, denies injuries

## 2018-02-02 NOTE — ED PROVIDER NOTES
"  History     Chief Complaint:  Loss of Consciousness    HPI   Sudeep Field is an 82-year-old male presenting to the ER accompanied by his son for evaluation of loss of consciousness.  History is obtained from the patient as well as son present at bedside.  Patient reports he went to bed last night in his otherwise normal state of health.  Upon awakening this morning, he reports he went from sitting to standing and then developed a sudden intense episode of abdominal pain to his left mid-abdomen, describing as if he was \"punched.\" This resulted in dizziness and subsequent syncope back into his bed. He denies sustaining any injuries from this fall.  He believes he was only out for a brief period of time.  Pain has since resolved.  He denies any chest pain, chest pressure, palpitations, vomiting, diarrhea.  He reports he has been drinking fluids, consuming 8 glasses of water per day.  He reports this is happened twice in the past for which he was seen in our ER. He was seen here on 6/18/16 for syncope, and had an ECHO, as noted below. He was also seen here on 10/11/17 for presyncope. He is on aspirin though no other anticoagulation.  The daughter notes that he has had issues with chronic dizziness in the mornings. She has encouraged him to take sips of water when this happens. She notes that he has dementia, and is not the best historian.  Patient denies any headache, weakness, numbness, nor slurring of speech.    Complete Portable Echo Adult (06/18/16)     Interpretation Summary:     The left ventricle is normal in size.  There is mild concentric left ventricular hypertrophy.  Left ventricular systolic function is normal.  The visual ejection fraction is estimated at 55%.  The mitral valve is normal in structure and function.  Normal tricuspid aortic valve  The aortic root is normal size.  There is no pericardial effusion.  The rhythm was normal sinus.    Allergies:  NKDA    Medications:  "   Ranitidine  Losartan  Aspirin    Past Medical History:    Aortic aneurism  GERD  HTN  IBS  Prostate cancer  Sleep apnea    Past Surgical History:    Hernia repair  Prostatectomy    Family History:    No past pertinent family history.    Social History:  Marital Status:   [5]  Negative for tobacco use.  Negative for alcohol use.    Review of Systems   Respiratory: Negative for chest tightness.    Cardiovascular: Negative for chest pain and palpitations.   Gastrointestinal: Positive for abdominal pain. Negative for diarrhea and vomiting.   Neurological: Positive for dizziness and syncope.   All other systems reviewed and are negative.    Physical Exam     Patient Vitals for the past 24 hrs:   BP Temp Temp src Pulse Heart Rate Resp SpO2 Weight   02/02/18 0648 (!) 171/96 - - - - - 99 % -   02/02/18 0646 (!) 165/96 - - - - - 97 % -   02/02/18 0645 - - - - - - 96 % -   02/02/18 0644 - - - - - - 100 % -   02/02/18 0643 142/84 - - - - - - -   02/02/18 0515 (!) 168/103 96.3  F (35.7  C) Temporal 72 72 24 98 % 90.7 kg (200 lb)     Physical Exam  General:                        Well-nourished                        Speaking in full sentences                        Oriented to place, though not person or time (baseline per son)  Head:                        No hematoma or step-off  Eyes:                        Conjunctiva without injection or scleral icterus                        PERRL  ENT:                        Dry mucous membranes                        Posterior oropharynx clear without erythema or exudate                        Nares patent                        Pinnae normal  Neck:                        Full ROM                        No stiffness appreciated  Resp:                        Lungs CTAB                        No crackles, wheezing or audible rubs                        Good air movement  CV:                                        Normal rate, regular rhythm                        S1 and S2  present                        No murmur, gallop or rub  GI:                        BS present, protuberant                        Abdomen soft without distention                        Non-tender to light and deep palpation                         No guarding or rebound tenderness  Skin:                        Warm, dry, well perfused                        No rashes or open wounds on exposed skin  MSK:                        Moves all extremities                        No focal deformities or swelling  Neuro:                        Alert                        Answers questions appropriately                        Moves all extremities equally                        Gait stable  Psych:                        Normal affect, normal mood    Emergency Department Course   ECG:  Indication: Syncope  Time: 0517  Vent. Rate 70 bpm. IN interval 184. P-R-T axis 68 1 43.  Normal sinus rhythm. Normal ECG. No significant change compared to EKG dated 10/11/17. Read time: 0520    Imaging:  Radiographic findings were communicated with the patient and family who voiced understanding of the findings.  CT Head without contrast:   No acute abnormality, as per radiology.    CT Chest/Abdomen/Pelvis:   1. There is a 3.8 cm abdominal aortic aneurysm. No aortic dissection.  2. Coronary artery atherosclerotic calcifications.  3. Cholelithiasis.  4. Small amount of fluid in the mesentery of the midabdomen without  focal inflammation. No bowel obstruction or inflammation, as per radiology.     Laboratory:  CBC: WBC: 5.9, HGB: 15.4, PLT: 243  BMP: All WNL (Creatinine: 1.00)    0556 Troponin: <0.015    0531 Lactic acid: 1.3    Creatinine: 1.1, GFR 64    Emergency Department Course:  Nursing notes and vitals reviewed. (0520) I performed an exam of the patient as documented above.     IV inserted. Blood drawn. This was sent to the lab for further testing, results above.    The patient was sent for a Chest CT and Head CT while in the emergency  department, findings above.     EKG obtained in the ED, see results above.     (0605) I rechecked the patient and discussed the results of his workup thus far. The patient's daughter arrived in the ED.    (0637) The patient is ambulatory here in the ED.    (0649) I reevaluated the patient and provided an update in regards to his ED course.      (0651) I consulted with Dr. Tariq, General Surgery, regarding the patient's history and presentation here in the emergency department.     (8825) I consulted with Dr. Rodriguez of Colon and Rectal surgery.    (7838)  I consulted with Bethany Pierce PA-C of the hospitalist services. They are in agreement to accept the patient for admission.    Findings and plan explained to the Patient and daughter who consents to admission. Discussed the patient with Bethany Pierce PA-C, who will admit the patient to a observation bed for further monitoring, evaluation, and treatment.      Impression & Plan      Medical Decision Making:  Sudeep Field is an 82 yr old M with a history of dementia and known infrarenal AAA presenting to the ER accompanied by son for evaluation of LOC.  VS on presentation reveal elevated BP, though are otherwise unremarkable.  Differential diagnosis includes, though is not limited to, cardiac causes (arrhythmia, acute coronary syndrome, aortic dissection, aortic stenosis, carotid sinus sensitivity) pulmonary embolism, CVA/TIA, seizure, AAA/rupture, subarachnoid hemorrhage, intracranial hemorrhage, orthostatic hypotension, hypoglycemia, toxicologic etiologies, and infection. Given presenting hypertension, known AAA, and sudden onset of abdominal pain prior to syncopal episode, care expedited to obtain CT C/A/P.  This again reveals 3.8 cm infra-renal aortic aneurysm without dissection or rupture.  Note is made of a small amount of fluid in the mesentery of the mid-abdomen, and rotation about the mesentery in the lower abdomen with a small amount of edema.  There is NO  evidence of bowel volvulus or obstruction.  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  I reviewed these findings and patient's clinical history with general surgery as well as colon and rectal surgery, who have looked at patient's CT findings.  Appreciate their input.  Concern is for transient volvulus with spontaneous detorsion, which likely contributed to severe episode of pain and subsequent syncope.  Risk factors include increased stool burden throughout colon.  Colon and rectal surgery does not feel patient requires emergent intervention or surgery, though have recommended bowel prep and stool regimen.  Other causes for syncope considered though felt less likely.  Orthostatic VS negative and pt reported abd pain prior to dizziness.  No CP, palpitations or SOB suggestive of dysrhythmia, ACS, nor PE and patient is without tachycardia nor hypoxia.  CT Head negative for acute process and neurologic exam is non-focal, with steady stable gait, arguing against cerebral ischemia.  Labs are unremarkable, including normal lactate.  Results discussed with patient and daughter at bedside.  Will plan for hospital observation given advanced age, dementia, for bowel prep, and close monitoring of abdominal pain.  Questions answered prior to admission.      Diagnosis:  Visit Diagnosis, Associated Orders, and Comments     ICD-10-CM    1. Abdominal aortic aneurysm (AAA) without rupture (H) I71.4 Lactic acid        2. Hepatic cyst K76.89    3. Syncope, unspecified syncope type R55    4. Abdominal pain, generalized R10.84          Disposition:  Admitted to observation    Scribe Disclosure:  Lisa SCRUGGS, am serving as a scribe on 2/2/2018 at 5:23 AM to personally document services performed by Geronimo Mcnally MD based on my observations and the  provider's statements to me.     Lisa Mulligan  2/2/2018   [facility] EMERGENCY DEPARTMENT       Geronimo Mcnally MD  02/02/18 6085

## 2018-02-02 NOTE — ED NOTES
[facility]  ED Nurse Handoff Report    HPI   Sudeep Field is an 82-year-old male presenting to the ER accompanied by his son for evaluation of loss of consciousness.  History is obtained from the patient as well as son present at bedside.  Patient reports he went to bed last night and is otherwise normal state of health.  Upon awakening this morning, he reports he went from sitting to standing when he then developed dizziness, and syncopized, falling back into his bed.  He denies sustaining any injuries from this fall.  He believes he was only out for a brief period of time.  Shortly thereafter, he developed an episode of left sided abdominal pain.  This has since resolved.  He denies any chest pain, chest pressure, palpitations, vomiting, diarrhea.  He reports he has been drinking fluids, consuming 8 glasses of water per day.  He reports this is happened twice in the past for which he was seen in our ER. He was seen here on 6/18/16 for syncope, and had an echo, as noted below. She was also seen here on 10/11/17 for presyncope. Family members subsequently brought him to the ER.  He is on aspirin though no other anticoagulation.     The daughter notes that he has had issues with chronic dizziness in the mornings. She has encouraged him to take sips of water when this happens. She notes that he has dementia, and is not the best historian.     Complete Portable Echo Adult (06/18/16)     Interpretation Summary:     The left ventricle is normal in size.  There is mild concentric left ventricular hypertrophy.  Left ventricular systolic function is normal.  The visual ejection fraction is estimated at 55%.  The mitral valve is normal in structure and function.  Normal tricuspid aortic valve  The aortic root is normal size.  There is no pericardial effusion.  The rhythm was normal sinus.       ED Chief complaint: Loss of Consciousness  ED Diagnosis:   Final diagnoses:   Abdominal aortic aneurysm (AAA) without  rupture (H)   Hepatic cyst   Syncope, unspecified syncope type   Abdominal pain, generalized     Allergies: No Known Allergies    Code Status: Full Code  Activity level - Baseline/Home:  Independent. Activity Level - Current:   Stand with Assist. Lift room needed: No. Bariatric: No   Needed: No   Isolation: No. Infection: Not Applicable.     Vital Signs:   Vitals:    02/02/18 0648 02/02/18 0730 02/02/18 0745 02/02/18 0800   BP: (!) 171/96 (!) 145/91 146/87 149/86   Pulse:       Resp:       Temp:       TempSrc:       SpO2: 99% 97% 97% 98%   Weight:           Cardiac Rhythm:  NSR    Pain level: 0-10 Pain Scale: 5  Patient confused: No. Patient Falls Risk: Yes.   Elimination Status: Has voided   Patient Report - Initial Complaint: Loss of Consciousness.    Focused Assessment:   05:20 Neurological Pupils (CN II) - Pupil PERRLA: yes  Stewart Coma Scale - Best Eye Response: 4-->(E4) spontaneous Best Motor Response: 6-->(M6) obeys commands Best Verbal Response: 4-->(V4) confused Stewart Coma Scale Score: 14 CT    05:20 Respiratory Respiratory - Lung Fields: All Fields Throughout All Lung Fields: clear; equal bilaterally       05:20 Cardiac Cardiac - Cardiac WDL: WDL  Review of Systems (Cardiac) - Cardiac Signs/Symptoms: denies       Tests Performed: EKG, Blood Work, Orthostatics, CT Chest/Abdomen/Pelvis w Contrast.   Abnormal Results:   Labs Ordered and Resulted from Time of ED Arrival Up to the Time of Departure from the ED   CBC WITH PLATELETS   BASIC METABOLIC PANEL   TROPONIN I   LACTIC ACID WHOLE BLOOD   CREATININE POCT   ORTHOSTATIC BLOOD PRESSURE AND PULSE     CT Chest/Abdomen/Pelvis w Contrast   Preliminary Result   IMPRESSION:   1. There is a 3.8 cm abdominal aortic aneurysm. No aortic dissection.   2. Coronary artery atherosclerotic calcifications.   3. Cholelithiasis.   4. Small amount of fluid in the mesentery of the midabdomen without   focal inflammation. No bowel obstruction or inflammation.       Head CT w/o contrast   Preliminary Result   IMPRESSION: No acute abnormality.             Treatments provided: Continuous Monitoring.   Family Comments: Daughter at Bedside.   OBS brochure/video discussed/provided to patient:  Yes  ED Medications:   Medications   0.9% sodium chloride BOLUS (0 mLs Intravenous Stopped 2/2/18 0615)   iopamidol (ISOVUE-370) solution 500 mL (75 mLs Intravenous Given 2/2/18 0614)     Drips infusing:  No  For the majority of the shift, the patient's behavior Green. Interventions performed were N/A.     Severe Sepsis OR Septic Shock Diagnosis Present: No      ED Nurse Name/Phone Number: Calista Beckman,   8:40 AM    RECEIVING UNIT ED HANDOFF REVIEW    Above ED Nurse Handoff Report was reviewed: Yes  Reviewed by: Marcelo Melo on February 2, 2018 at 10:48 AM

## 2018-02-03 VITALS
OXYGEN SATURATION: 96 % | WEIGHT: 221.5 LBS | HEART RATE: 73 BPM | BODY MASS INDEX: 29.36 KG/M2 | HEIGHT: 73 IN | RESPIRATION RATE: 16 BRPM | DIASTOLIC BLOOD PRESSURE: 74 MMHG | TEMPERATURE: 98 F | SYSTOLIC BLOOD PRESSURE: 143 MMHG

## 2018-02-03 LAB
ANION GAP SERPL CALCULATED.3IONS-SCNC: 3 MMOL/L (ref 3–14)
BUN SERPL-MCNC: 17 MG/DL (ref 7–30)
CALCIUM SERPL-MCNC: 8.4 MG/DL (ref 8.5–10.1)
CHLORIDE SERPL-SCNC: 100 MMOL/L (ref 94–109)
CO2 SERPL-SCNC: 30 MMOL/L (ref 20–32)
CREAT SERPL-MCNC: 0.98 MG/DL (ref 0.66–1.25)
ERYTHROCYTE [DISTWIDTH] IN BLOOD BY AUTOMATED COUNT: 12.2 % (ref 10–15)
GFR SERPL CREATININE-BSD FRML MDRD: 73 ML/MIN/1.7M2
GLUCOSE SERPL-MCNC: 91 MG/DL (ref 70–99)
HCT VFR BLD AUTO: 44.1 % (ref 40–53)
HGB BLD-MCNC: 15 G/DL (ref 13.3–17.7)
MCH RBC QN AUTO: 31.8 PG (ref 26.5–33)
MCHC RBC AUTO-ENTMCNC: 34 G/DL (ref 31.5–36.5)
MCV RBC AUTO: 94 FL (ref 78–100)
PLATELET # BLD AUTO: 222 10E9/L (ref 150–450)
POTASSIUM SERPL-SCNC: 4.3 MMOL/L (ref 3.4–5.3)
RBC # BLD AUTO: 4.71 10E12/L (ref 4.4–5.9)
SODIUM SERPL-SCNC: 133 MMOL/L (ref 133–144)
WBC # BLD AUTO: 5.2 10E9/L (ref 4–11)

## 2018-02-03 PROCEDURE — G0378 HOSPITAL OBSERVATION PER HR: HCPCS

## 2018-02-03 PROCEDURE — 85027 COMPLETE CBC AUTOMATED: CPT | Performed by: PHYSICIAN ASSISTANT

## 2018-02-03 PROCEDURE — 99217 ZZC OBSERVATION CARE DISCHARGE: CPT | Performed by: PHYSICIAN ASSISTANT

## 2018-02-03 PROCEDURE — 25000132 ZZH RX MED GY IP 250 OP 250 PS 637: Mod: GY | Performed by: PHYSICIAN ASSISTANT

## 2018-02-03 PROCEDURE — 36415 COLL VENOUS BLD VENIPUNCTURE: CPT | Performed by: PHYSICIAN ASSISTANT

## 2018-02-03 PROCEDURE — 80048 BASIC METABOLIC PNL TOTAL CA: CPT | Performed by: PHYSICIAN ASSISTANT

## 2018-02-03 PROCEDURE — A9270 NON-COVERED ITEM OR SERVICE: HCPCS | Mod: GY | Performed by: PHYSICIAN ASSISTANT

## 2018-02-03 RX ORDER — POLYETHYLENE GLYCOL 3350 17 G/17G
17 POWDER, FOR SOLUTION ORAL DAILY PRN
Qty: 7 PACKET | Refills: 0 | COMMUNITY
Start: 2018-02-03

## 2018-02-03 RX ADMIN — ASPIRIN 81 MG 81 MG: 81 TABLET ORAL at 09:41

## 2018-02-03 NOTE — PLAN OF CARE
Problem: Patient Care Overview  Goal: Plan of Care/Patient Progress Review  Outcome: Improving  PRIMARY DIAGNOSIS: constipation   OUTPATIENT/OBSERVATION GOALS TO BE MET BEFORE DISCHARGE:  1. ADLs back to baseline: Yes    2. Activity and level of assistance: Up with standby assistance.    3. Pain status: Pain free.    4. Return to near baseline physical activity: Yes     Discharge Planner Nurse   Safe discharge environment identified: Yes  Barriers to discharge: Yes       Entered by: Gretchen Davidson 02/02/2018 7:11 PM       Pt has had 1 small stool and 1 small to medium loose watery stool. Magcitrate, cruz, lactulose, and prune juice given. Pt abd obese, distended. Pt states that is is less distended and his lightheadedness has resolved. SBA, VSS,      Please review provider order for any additional goals.   Nurse to notify provider when observation goals have been met and patient is ready for discharge.

## 2018-02-03 NOTE — PLAN OF CARE
Problem: Patient Care Overview  Goal: Plan of Care/Patient Progress Review  PRIMARY DIAGNOSIS: abdominal pain, constipation  OUTPATIENT/OBSERVATION GOALS TO BE MET BEFORE DISCHARGE:  1. ADLs back to baseline: Yes    2. Activity and level of assistance: Up with standby assistance.    3. Pain status: Pain free.    4. Return to near baseline physical activity: Yes     Discharge Planner Nurse   Safe discharge environment identified: Yes  Barriers to discharge: Yes       Entered by: Charlotte Haq 02/03/2018 2:09 AM     VSS on RA. Pt denies any abdominal pain or discomfort. BS+, denies any nausea. Tolerating regular diet. Up SBA in room, does have stress incontinence. Denies any dizziness/lightheadedness. Will continue to monitor and provide supportive cares.       Please review provider order for any additional goals.   Nurse to notify provider when observation goals have been met and patient is ready for discharge.

## 2018-02-03 NOTE — PLAN OF CARE
Problem: Patient Care Overview  Goal: Plan of Care/Patient Progress Review  Outcome: Adequate for Discharge Date Met: 02/03/18  OBSERVATION patient END time: 1130  Patient's After Visit Summary was reviewed with patient and daughter Gill.   Patient and daughter verbalized understanding of After Visit Summary, recommended follow up and was given an opportunity to ask questions.   Discharge medications sent home with patient/family: No   Discharged with daughter at 1145.

## 2018-02-03 NOTE — DISCHARGE SUMMARY
[facility] Outpatient / Observation Unit  Discharge Summary        Sudeep Field MRN# [id number]   YOB: 1935 Age: 82 year old     Date of Admission:  2/2/2018  Date of Discharge:  2/3/2018  Admitting Physician:  Franco Brewer, DO  Discharge Physician: Lisa Cook PA-C, KIET  Discharging Service: Hospitalist      Primary Provider: [facility]  Primary Care Physician Phone Number: None         Primary Discharge Diagnoses:    Sudeep Field was admitted on 2/2/2018 for concerns of nausea, vomiting and diarrhea. Consistent with acute gastroenteritis.     1. Acute Abdominal Pain - CT showed small amount of fluid in the mesentery with rotation about the mesentery but no volvulus. Moderate stool throughout the colon noted. CRS contacted and they felt the mesentery changes and symptoms were due to constipation. Patient was given bowel prep and had multiple BMs after admission. Symptoms resolved. Patient tolerating regular diet without issues.   -- recommend probiotics 1-2 times daily  -- Miralax as needed   -- Fibercon daily  -- follow up with PCP in 1 week for recheck.    2. AAA - known. 3.8 cm on CT this admission. Recommend 1 year follow up US.        Secondary Discharge Diagnoses:     Past Medical History:   Diagnosis Date     Aortic aneurysm (H)      Gastroesophageal reflux disease      Hypertension      IBS (irritable bowel syndrome)      Prostate cancer (H)      Sleep apnea             Code Status:      Full Code        Brief Hospital Summary:        Reason for your hospital stay       You were registered to the observation unit for abdominal pain. CT   scanning showed constipation with some rotation and swelling around the   mesentery, though to be associated with the constipation. You were given   medication to induce stool and your symptoms improved.                  Please refer to initial admission history and physical for further details.   Briefly, Sudeep Field was admitted  "on 2/2/2018 for concerns of acute abdominal pain. Work up in ED did not show any evidence of bowel obstruction but did show rotation about the mesentery in the lower abdomen and there is a small amount of mesenteric edema. No bowel volvulus.\" There is no bowel obstruction or inflammation. The appendix was normal. Moderate amount of stool throughout the colon. No obstruction. No free, intraperitoneal gas or fluid. Pt was registered to the Observation Unit for continued supportive therapy.     Pt was resuscitated with IV fluids and continued on supportive measures including anti-emetics and pain control as needed. He was given a bowel prep with good results. Labs were reviewed and significant results addressed.  On the day of discharge, symptoms were resolving and pt was able to tolerate PO intake. Vitals were stable, without evidence of orthostasis. Medications were reviewed and adjustments made as necessary. Pt is instructed to follow up as below.            Significant Lab During Hospitalization:        Recent Labs  Lab 02/03/18  0659 02/02/18  0520   WBC 5.2 5.9   HGB 15.0 15.4   HCT 44.1 45.2   MCV 94 95    243          Lab Results   Component Value Date     02/03/2018     02/02/2018     10/11/2017    Lab Results   Component Value Date    CHLORIDE 100 02/03/2018    CHLORIDE 101 02/02/2018    CHLORIDE 99 10/11/2017    Lab Results   Component Value Date    BUN 17 02/03/2018    BUN 23 02/02/2018    BUN 21 10/11/2017      Lab Results   Component Value Date    POTASSIUM 4.3 02/03/2018    POTASSIUM 4.3 02/02/2018    POTASSIUM 4.1 10/11/2017    Lab Results   Component Value Date    CO2 30 02/03/2018    CO2 27 02/02/2018    CO2 27 10/11/2017    Lab Results   Component Value Date    CR 0.98 02/03/2018    CR 1.00 02/02/2018    CR 1.01 10/11/2017        No results for input(s): CULT in the last 168 hours.    Recent Labs  Lab 02/03/18  0659 02/02/18  0528 02/02/18  0520     --  136   POTASSIUM " 4.3  --  4.3   CHLORIDE 100  --  101   CO2 30  --  27   ANIONGAP 3  --  8   GLC 91  --  95   BUN 17  --  23   CR 0.98  --  1.00   GFRESTIMATED 73 64 72   GFRESTBLACK 88 78 87   KINGSLEY 8.4*  --  8.7     No results for input(s): INR in the last 168 hours.    Recent Labs  Lab 02/02/18  1614 02/02/18  0520   LACT 1.3 1.3     No results for input(s): LIPASE in the last 168 hours.  No results for input(s): TSH in the last 168 hours.    Recent Labs  Lab 02/02/18  0520   TROPI <0.015     No results for input(s): COLOR, APPEARANCE, URINEGLC, URINEBILI, URINEKETONE, SG, UBLD, URINEPH, PROTEIN, UROBILINOGEN, NITRITE, LEUKEST, RBCU, WBCU in the last 168 hours.             Significant Imaging During Hospitalization:      Recent Results (from the past 48 hour(s))   Head CT w/o contrast    Narrative    CT SCAN OF THE HEAD WITHOUT CONTRAST   2/2/2018 6:26 AM     HISTORY: Syncope.     TECHNIQUE:  Axial images of the head and coronal reformations without  IV contrast material. Radiation dose for this scan was reduced using  automated exposure control, adjustment of the mA and/or kV according  to patient size, or iterative reconstruction technique.    COMPARISON: 10/11/2017.    FINDINGS:  There is generalized atrophy of the brain.  There is low  attenuation in the white matter of the cerebral hemispheres consistent  with sequelae of small vessel ischemic disease. There is no evidence  of intracranial hemorrhage, mass, acute infarct or anomaly.     The visualized portions of the sinuses and mastoids appear normal.  There is no evidence of trauma.      Impression    IMPRESSION: No acute abnormality.    DAVID PAREKH MD   CT Chest/Abdomen/Pelvis w Contrast    Narrative    CT CHEST/ABDOMEN/PELVIS W CONTRAST  2/2/2018 6:28 AM    HISTORY:  Syncope, abdominal pain, known aortic aneurysm, ?dissection.      TECHNIQUE: CT scan obtained of the chest, abdomen, and pelvis with  oral and IV contrast.  75mL [id number] injected. Radiation dose  for  this scan was reduced using automated exposure control, adjustment of  the mA and/or kV according to patient size, or iterative  reconstruction technique.    COMPARISON:  None.    FINDINGS:  Chest: There is no aortic aneurysm or dissection. There are coronary  artery atherosclerotic calcifications. Heart size is normal. No  mediastinal, hilar or axillary lymph node enlargement. There is  dependent atelectasis bilaterally. No pneumothorax or pleural  effusion.     ABDOMEN: There are several hepatic cysts. There are stones in the  gallbladder. The spleen, pancreas, adrenal glands and right kidney are  normal in appearance. There is a cyst at the lower pole of the left  kidney. There is no abdominal or pelvic lymph node enlargement. There  is atherosclerotic calcification of the aorta and its branches. There  is a distal abdominal aortic aneurysm measuring 3.8 cm AP. No aortic  dissection.    Pelvis: There is no bowel obstruction or inflammation. The appendix is  normal. Moderate  amount of stool throughout the colon. There is  rotation about the mesentery in the lower abdomen and there is a small  amount of mesenteric edema. No bowel volvulus. No obstruction. No free  intraperitoneal gas or fluid. There is degenerative disease in the  spine.      Impression    IMPRESSION:  1. There is a 3.8 cm abdominal aortic aneurysm. No aortic dissection.  2. Coronary artery atherosclerotic calcifications.  3. Cholelithiasis.  4. Small amount of fluid in the mesentery of the midabdomen without  focal inflammation. No bowel obstruction or inflammation.    DAVID PAREKH MD              Pending Results:        Unresulted Labs Ordered in the Past 30 Days of this Admission     No orders found for last 61 day(s).              Consultations This Hospital Stay:      CRS was consulted curbside         Discharge Instructions and Follow-Up:      Follow-up Appointments     Follow-up and recommended labs and tests        1. Start taking  "over the counter probiotics 1-2 times a day (any generic   brand is okay).  2. Take Miralax over the counter as needed for constipation. Ideally you   will have at least 1-2 stools daily.   3. You can also take over the counter FiberCon for fiber supplementation.   4. Follow up with your family doctor in 1 week for recheck.                  Pt instructed to follow up with PCP in 7 days and with Consultant if indicated.   Follow-up Labs None        Discharge Disposition:      Discharged to home         Discharge Medications:        Current Discharge Medication List      START taking these medications    Details   polyethylene glycol (MIRALAX/GLYCOLAX) Packet Take 17 g by mouth daily as needed for constipation  Qty: 7 packet, Refills: 0    Associated Diagnoses: Constipation, unspecified constipation type         CONTINUE these medications which have NOT CHANGED    Details   LOSARTAN POTASSIUM PO Take 25 mg by mouth At Bedtime       VITAMIN D, CHOLECALCIFEROL, PO Take 2,000 Units by mouth daily       ASPIRIN PO Take 81 mg by mouth daily       multivitamin, therapeutic with minerals (MULTI-VITAMIN) TABS Take 1 tablet by mouth daily                 Allergies:       No Known Allergies        Condition and Physical on Discharge:      Discharge condition: Stable   Vitals: Blood pressure 155/82, pulse 58, temperature 97.7  F (36.5  C), temperature source Oral, resp. rate 16, height 1.854 m (6' 1\"), weight 100.5 kg (221 lb 8 oz), SpO2 96 %.  221 lbs 8 oz      GENERAL:  Comfortable.  PSYCH: pleasant, oriented, No acute distress.  HEENT:  PERRLA. Normal conjunctiva, normal hearing, nasal mucosa and Oropharynx are normal.  NECK:  Supple, no neck vein distention, adenopathy or bruits, normal thyroid.  HEART:  Normal S1, S2 with no murmur, no pericardial rub, gallops or S3 or S4.  LUNGS:  Clear to auscultation, normal Respiratory effort. No wheezing, rales or ronchi.  ABDOMEN:  Soft,rounded. no hepatosplenomegaly, normal bowel " sounds. Non-tender.   EXTREMITIES:  No pedal edema, +2 pulses bilateral and equal.  SKIN:  Dry to touch, No rash, wound or ulcerations.  NEUROLOGIC:  CN 2-12 intact, BL 5/5 symmetric upper and lower extremity strength, sensation is intact with no focal deficits.

## 2018-02-03 NOTE — PLAN OF CARE
Problem: Patient Care Overview  Goal: Plan of Care/Patient Progress Review  PRIMARY DIAGNOSIS: abdominal pain, constipation  OUTPATIENT/OBSERVATION GOALS TO BE MET BEFORE DISCHARGE:  1. ADLs back to baseline: Yes    2. Activity and level of assistance: Up with standby assistance.    3. Pain status: Pain free.    4. Return to near baseline physical activity: Yes     Discharge Planner Nurse   Safe discharge environment identified: Yes  Barriers to discharge: Yes       Entered by: Charlotte Haq 02/03/2018 4:51 AM     VSS on RA. Pt denies any abdominal pain or discomfort. BS+, denies any nausea. Up SBA in room, does have stress incontinence. Denies any dizziness/lightheadedness. Will continue to monitor and provide supportive cares.       Please review provider order for any additional goals.   Nurse to notify provider when observation goals have been met and patient is ready for discharge.

## 2019-02-02 ENCOUNTER — HOSPITAL ENCOUNTER (EMERGENCY)
Facility: CLINIC | Age: 84
Discharge: HOME | End: 2019-02-02
Attending: PHYSICIAN ASSISTANT | Admitting: PHYSICIAN ASSISTANT
Payer: COMMERCIAL

## 2019-02-02 VITALS
TEMPERATURE: 98 F | DIASTOLIC BLOOD PRESSURE: 67 MMHG | OXYGEN SATURATION: 99 % | SYSTOLIC BLOOD PRESSURE: 128 MMHG | HEART RATE: 59 BPM | RESPIRATION RATE: 20 BRPM

## 2019-02-02 DIAGNOSIS — R11.2 NAUSEA WITH VOMITING: ICD-10-CM

## 2019-02-02 LAB
ANION GAP SERPL CALCULATED.3IONS-SCNC: 10 MMOL/L (ref 3–14)
BASOPHILS # BLD AUTO: 0 10E9/L (ref 0–0.2)
BASOPHILS NFR BLD AUTO: 0.4 %
BUN SERPL-MCNC: 29 MG/DL (ref 7–30)
CALCIUM SERPL-MCNC: 8.1 MG/DL (ref 8.5–10.1)
CHLORIDE SERPL-SCNC: 109 MMOL/L (ref 94–109)
CO2 SERPL-SCNC: 21 MMOL/L (ref 20–32)
CREAT SERPL-MCNC: 0.97 MG/DL (ref 0.66–1.25)
DIFFERENTIAL METHOD BLD: NORMAL
EOSINOPHIL # BLD AUTO: 0.1 10E9/L (ref 0–0.7)
EOSINOPHIL NFR BLD AUTO: 1.3 %
ERYTHROCYTE [DISTWIDTH] IN BLOOD BY AUTOMATED COUNT: 12.6 % (ref 10–15)
GFR SERPL CREATININE-BSD FRML MDRD: 72 ML/MIN/{1.73_M2}
GLUCOSE SERPL-MCNC: 116 MG/DL (ref 70–99)
HCT VFR BLD AUTO: 47 % (ref 40–53)
HGB BLD-MCNC: 16 G/DL (ref 13.3–17.7)
IMM GRANULOCYTES # BLD: 0 10E9/L (ref 0–0.4)
IMM GRANULOCYTES NFR BLD: 0.3 %
LYMPHOCYTES # BLD AUTO: 2.5 10E9/L (ref 0.8–5.3)
LYMPHOCYTES NFR BLD AUTO: 32 %
MCH RBC QN AUTO: 32.8 PG (ref 26.5–33)
MCHC RBC AUTO-ENTMCNC: 34 G/DL (ref 31.5–36.5)
MCV RBC AUTO: 96 FL (ref 78–100)
MONOCYTES # BLD AUTO: 0.8 10E9/L (ref 0–1.3)
MONOCYTES NFR BLD AUTO: 9.8 %
NEUTROPHILS # BLD AUTO: 4.4 10E9/L (ref 1.6–8.3)
NEUTROPHILS NFR BLD AUTO: 56.2 %
NRBC # BLD AUTO: 0 10*3/UL
NRBC BLD AUTO-RTO: 0 /100
PLATELET # BLD AUTO: 243 10E9/L (ref 150–450)
POTASSIUM SERPL-SCNC: 4.2 MMOL/L (ref 3.4–5.3)
RBC # BLD AUTO: 4.88 10E12/L (ref 4.4–5.9)
SODIUM SERPL-SCNC: 140 MMOL/L (ref 133–144)
TROPONIN I SERPL-MCNC: <0.015 UG/L (ref 0–0.04)
WBC # BLD AUTO: 7.9 10E9/L (ref 4–11)

## 2019-02-02 PROCEDURE — 84484 ASSAY OF TROPONIN QUANT: CPT | Performed by: PHYSICIAN ASSISTANT

## 2019-02-02 PROCEDURE — 96361 HYDRATE IV INFUSION ADD-ON: CPT

## 2019-02-02 PROCEDURE — 85025 COMPLETE CBC W/AUTO DIFF WBC: CPT | Performed by: PHYSICIAN ASSISTANT

## 2019-02-02 PROCEDURE — 96374 THER/PROPH/DIAG INJ IV PUSH: CPT

## 2019-02-02 PROCEDURE — 25000128 H RX IP 250 OP 636: Performed by: PHYSICIAN ASSISTANT

## 2019-02-02 PROCEDURE — 80048 BASIC METABOLIC PNL TOTAL CA: CPT | Performed by: PHYSICIAN ASSISTANT

## 2019-02-02 PROCEDURE — 99284 EMERGENCY DEPT VISIT MOD MDM: CPT | Mod: 25

## 2019-02-02 RX ORDER — ONDANSETRON 2 MG/ML
4 INJECTION INTRAMUSCULAR; INTRAVENOUS ONCE
Status: COMPLETED | OUTPATIENT
Start: 2019-02-02 | End: 2019-02-02

## 2019-02-02 RX ORDER — ONDANSETRON 4 MG/1
4 TABLET, ORALLY DISINTEGRATING ORAL EVERY 8 HOURS PRN
Qty: 12 TABLET | Refills: 0 | Status: SHIPPED | OUTPATIENT
Start: 2019-02-02 | End: 2019-02-05

## 2019-02-02 RX ADMIN — SODIUM CHLORIDE 500 ML: 9 INJECTION, SOLUTION INTRAVENOUS at 19:33

## 2019-02-02 RX ADMIN — ONDANSETRON 4 MG: 2 INJECTION INTRAMUSCULAR; INTRAVENOUS at 19:33

## 2019-02-02 ASSESSMENT — ENCOUNTER SYMPTOMS
VOMITING: 1
DIARRHEA: 0
NAUSEA: 1
ABDOMINAL PAIN: 0

## 2019-02-02 NOTE — ED AVS SNAPSHOT
[facility] Emergency Department  [address]  [facility] [address]  Phone:  [phone]  Fax:  [phone]                                    Sudeep Field   MRN: [id number]    Department:  [facility] Emergency Department   Date of Visit:  2/2/2019           After Visit Summary Signature Page    I have received my discharge instructions, and my questions have been answered. I have discussed any challenges I see with this plan with the nurse or doctor.    ..........................................................................................................................................  Patient/Patient Representative Signature      ..........................................................................................................................................  Patient Representative Print Name and Relationship to Patient    ..................................................               ................................................  Date                                   Time    ..........................................................................................................................................  Reviewed by Signature/Title    ...................................................              ..............................................  Date                                               Time          22EPIC Rev 08/18

## 2019-02-03 NOTE — ED TRIAGE NOTES
Pt started having nausea and vomiting since 1730. Family noted redness from the emesis. Denies diarrhea. ABCs intact. A&O2 - baseline per family. Some dementia. Pt takes baby aspirin today.

## 2019-02-03 NOTE — DISCHARGE INSTRUCTIONS
*Drink plenty of fluids and advance diet slowly.  *Take medications as prescribed.  Zofran for nausea. Continue your current medications.  *Follow-up with your doctor for a recheck in 2-3 days.  *Return if you vomit up blood, black stool, unable to keep fluids down, develop severe abdominal pain, faint or feel like you will faint or become worse in any way.    Discharge Instructions  Gastroenteritis    You have been seen today for vomiting and diarrhea, called gastroenteritis or the stomach flu. This is usually caused by a virus, but some bacteria, parasites, medicines or other medical conditions can cause similar symptoms. At this time your doctor does not find that your vomiting and diarrhea is a sign of anything dangerous or life-threatening.  However, sometimes the signs of serious illness do not show up right away.  If you have new or worse symptoms, you may need to be seen again in the emergency department or by your primary doctor. Remember that serious problems like appendicitis can look like gastroenteritis at first.       Return to the Emergency Department if:  You keep throwing up and you are not able to keep liquids down.  You feel you are getting dehydrated, such as being very thirsty, not urinating at least every 8-12 hours, or feeling faint or lightheaded.   You develop a new fever, or your fever continues for more than 2 days.  You have belly pain that seems worse than cramps, is in one spot, or is getting worse over time.   You have blood in your vomit or in your diarrhea.  You feel very weak   You are not starting to improve within 24 hours of your visit here    What can I do to help myself?  The most important thing to do is to drink clear liquids.  If you have been vomiting a lot, it is best to have only small, frequent sips of liquids.  Drinking too much at once may cause more vomiting. If you are vomiting often, you must replace minerals, sodium and potassium lost with your illness. Pedialyte   and sports drinks can help you replace these minerals.  You can also drink clear liquids such as water, weak tea, apple juice, and 7-up. Avoid acid liquids (orange), caffeine (coffee) or alcohol. Do not drink milk until you no longer have diarrhea.  After liquids are staying down, you may start eating mild foods. Soda crackers, toast, plain noodles, gelatin, applesauce and bananas are good first choices.  Avoid foods that have acid, are spicy, fatty or fibrous (such as meats, coarse grains, vegetables). You may start eating these foods again in about 3 days when you are better.  Sometimes treatment includes prescription medicine to prevent nausea and vomiting and to prevent diarrhea. If your doctor prescribes these for you, take them as directed.  Nonprescription medicine is available for the treatment of diarrhea and can be very effective.  If you use it, make sure you use the dose recommended on the package.  Avoid Lomotil. Check with your healthcare provider before you use any medicine for diarrhea.  Don?t take ibuprofen, or other nonsteroidal anti-inflammatory medicines without checking with your healthcare provider.      Remember that you can always come back to the Emergency Department if you are not able to see your regular doctor in the amount of time listed above, if you get any new symptoms, or if there is anything that worries you.

## 2019-02-04 LAB — INTERPRETATION ECG - MUSE: NORMAL

## 2022-05-03 ENCOUNTER — LAB REQUISITION (OUTPATIENT)
Dept: LAB | Facility: CLINIC | Age: 87
End: 2022-05-03
Payer: COMMERCIAL

## 2022-05-03 DIAGNOSIS — E55.9 VITAMIN D DEFICIENCY, UNSPECIFIED: ICD-10-CM

## 2022-05-03 DIAGNOSIS — R53.1 WEAKNESS: ICD-10-CM

## 2022-05-03 DIAGNOSIS — I10 ESSENTIAL (PRIMARY) HYPERTENSION: ICD-10-CM

## 2022-05-04 LAB
ANION GAP SERPL CALCULATED.3IONS-SCNC: 9 MMOL/L (ref 5–18)
BUN SERPL-MCNC: 23 MG/DL (ref 8–28)
CALCIUM SERPL-MCNC: 8.8 MG/DL (ref 8.5–10.5)
CHLORIDE BLD-SCNC: 106 MMOL/L (ref 98–107)
CO2 SERPL-SCNC: 24 MMOL/L (ref 22–31)
CREAT SERPL-MCNC: 1.04 MG/DL (ref 0.7–1.3)
DEPRECATED CALCIDIOL+CALCIFEROL SERPL-MC: 30 UG/L (ref 20–75)
ERYTHROCYTE [DISTWIDTH] IN BLOOD BY AUTOMATED COUNT: 12.4 % (ref 10–15)
GFR SERPL CREATININE-BSD FRML MDRD: 70 ML/MIN/1.73M2
GLUCOSE BLD-MCNC: 93 MG/DL (ref 70–125)
HCT VFR BLD AUTO: 40.1 % (ref 40–53)
HGB BLD-MCNC: 13.1 G/DL (ref 13.3–17.7)
MCH RBC QN AUTO: 31 PG (ref 26.5–33)
MCHC RBC AUTO-ENTMCNC: 32.7 G/DL (ref 31.5–36.5)
MCV RBC AUTO: 95 FL (ref 78–100)
PLATELET # BLD AUTO: 323 10E3/UL (ref 150–450)
POTASSIUM BLD-SCNC: 4.1 MMOL/L (ref 3.5–5)
RBC # BLD AUTO: 4.22 10E6/UL (ref 4.4–5.9)
SODIUM SERPL-SCNC: 139 MMOL/L (ref 136–145)
TSH SERPL DL<=0.005 MIU/L-ACNC: 1.24 UIU/ML (ref 0.3–5)
VIT B12 SERPL-MCNC: 711 PG/ML (ref 213–816)
WBC # BLD AUTO: 5.2 10E3/UL (ref 4–11)

## 2022-05-04 PROCEDURE — 84443 ASSAY THYROID STIM HORMONE: CPT | Mod: ORL | Performed by: NURSE PRACTITIONER

## 2022-05-04 PROCEDURE — 85027 COMPLETE CBC AUTOMATED: CPT | Mod: ORL | Performed by: NURSE PRACTITIONER

## 2022-05-04 PROCEDURE — 80048 BASIC METABOLIC PNL TOTAL CA: CPT | Mod: ORL | Performed by: NURSE PRACTITIONER

## 2022-05-04 PROCEDURE — P9604 ONE-WAY ALLOW PRORATED TRIP: HCPCS | Mod: ORL | Performed by: NURSE PRACTITIONER

## 2022-05-04 PROCEDURE — 36415 COLL VENOUS BLD VENIPUNCTURE: CPT | Mod: ORL | Performed by: NURSE PRACTITIONER

## 2022-05-04 PROCEDURE — 82607 VITAMIN B-12: CPT | Mod: ORL | Performed by: NURSE PRACTITIONER

## 2022-05-04 PROCEDURE — 82306 VITAMIN D 25 HYDROXY: CPT | Mod: ORL | Performed by: NURSE PRACTITIONER

## 2022-12-12 ENCOUNTER — LAB REQUISITION (OUTPATIENT)
Dept: LAB | Facility: CLINIC | Age: 87
End: 2022-12-12
Payer: COMMERCIAL

## 2022-12-12 DIAGNOSIS — I10 ESSENTIAL (PRIMARY) HYPERTENSION: ICD-10-CM

## 2022-12-14 LAB
ANION GAP SERPL CALCULATED.3IONS-SCNC: 11 MMOL/L (ref 7–15)
BUN SERPL-MCNC: 18.5 MG/DL (ref 8–23)
CALCIUM SERPL-MCNC: 8.9 MG/DL (ref 8.8–10.2)
CHLORIDE SERPL-SCNC: 105 MMOL/L (ref 98–107)
CREAT SERPL-MCNC: 1.17 MG/DL (ref 0.67–1.17)
DEPRECATED HCO3 PLAS-SCNC: 22 MMOL/L (ref 22–29)
ERYTHROCYTE [DISTWIDTH] IN BLOOD BY AUTOMATED COUNT: 12.8 % (ref 10–15)
GFR SERPL CREATININE-BSD FRML MDRD: 61 ML/MIN/1.73M2
GLUCOSE SERPL-MCNC: 98 MG/DL (ref 70–99)
HCT VFR BLD AUTO: 44.6 % (ref 40–53)
HGB BLD-MCNC: 15 G/DL (ref 13.3–17.7)
MCH RBC QN AUTO: 31.6 PG (ref 26.5–33)
MCHC RBC AUTO-ENTMCNC: 33.6 G/DL (ref 31.5–36.5)
MCV RBC AUTO: 94 FL (ref 78–100)
PLATELET # BLD AUTO: 296 10E3/UL (ref 150–450)
POTASSIUM SERPL-SCNC: 4.6 MMOL/L (ref 3.4–5.3)
RBC # BLD AUTO: 4.74 10E6/UL (ref 4.4–5.9)
SODIUM SERPL-SCNC: 138 MMOL/L (ref 136–145)
WBC # BLD AUTO: 6.1 10E3/UL (ref 4–11)

## 2022-12-14 PROCEDURE — 80048 BASIC METABOLIC PNL TOTAL CA: CPT | Mod: ORL | Performed by: NURSE PRACTITIONER

## 2022-12-14 PROCEDURE — P9604 ONE-WAY ALLOW PRORATED TRIP: HCPCS | Mod: ORL | Performed by: NURSE PRACTITIONER

## 2022-12-14 PROCEDURE — 36415 COLL VENOUS BLD VENIPUNCTURE: CPT | Mod: ORL | Performed by: NURSE PRACTITIONER

## 2022-12-14 PROCEDURE — 85027 COMPLETE CBC AUTOMATED: CPT | Mod: ORL | Performed by: NURSE PRACTITIONER

## 2023-06-13 ENCOUNTER — LAB REQUISITION (OUTPATIENT)
Dept: LAB | Facility: CLINIC | Age: 88
End: 2023-06-13
Payer: COMMERCIAL

## 2023-06-13 DIAGNOSIS — I10 ESSENTIAL (PRIMARY) HYPERTENSION: ICD-10-CM

## 2023-06-14 LAB
ANION GAP SERPL CALCULATED.3IONS-SCNC: 10 MMOL/L (ref 7–15)
BUN SERPL-MCNC: 25 MG/DL (ref 8–23)
CALCIUM SERPL-MCNC: 9 MG/DL (ref 8.8–10.2)
CHLORIDE SERPL-SCNC: 103 MMOL/L (ref 98–107)
CREAT SERPL-MCNC: 1.15 MG/DL (ref 0.67–1.17)
DEPRECATED HCO3 PLAS-SCNC: 24 MMOL/L (ref 22–29)
ERYTHROCYTE [DISTWIDTH] IN BLOOD BY AUTOMATED COUNT: 13.1 % (ref 10–15)
GFR SERPL CREATININE-BSD FRML MDRD: 62 ML/MIN/1.73M2
GLUCOSE SERPL-MCNC: 91 MG/DL (ref 70–99)
HCT VFR BLD AUTO: 39.6 % (ref 40–53)
HGB BLD-MCNC: 13 G/DL (ref 13.3–17.7)
MCH RBC QN AUTO: 31.4 PG (ref 26.5–33)
MCHC RBC AUTO-ENTMCNC: 32.8 G/DL (ref 31.5–36.5)
MCV RBC AUTO: 96 FL (ref 78–100)
PLATELET # BLD AUTO: 252 10E3/UL (ref 150–450)
POTASSIUM SERPL-SCNC: 4.3 MMOL/L (ref 3.4–5.3)
RBC # BLD AUTO: 4.14 10E6/UL (ref 4.4–5.9)
SODIUM SERPL-SCNC: 137 MMOL/L (ref 136–145)
WBC # BLD AUTO: 6.2 10E3/UL (ref 4–11)

## 2023-06-14 PROCEDURE — P9604 ONE-WAY ALLOW PRORATED TRIP: HCPCS | Mod: ORL | Performed by: NURSE PRACTITIONER

## 2023-06-14 PROCEDURE — 36415 COLL VENOUS BLD VENIPUNCTURE: CPT | Mod: ORL | Performed by: NURSE PRACTITIONER

## 2023-06-14 PROCEDURE — 85027 COMPLETE CBC AUTOMATED: CPT | Mod: ORL | Performed by: NURSE PRACTITIONER

## 2023-06-14 PROCEDURE — 80048 BASIC METABOLIC PNL TOTAL CA: CPT | Mod: ORL | Performed by: NURSE PRACTITIONER

## 2024-01-23 ENCOUNTER — LAB REQUISITION (OUTPATIENT)
Dept: LAB | Facility: CLINIC | Age: 89
End: 2024-01-23
Payer: COMMERCIAL

## 2024-01-23 DIAGNOSIS — I10 ESSENTIAL (PRIMARY) HYPERTENSION: ICD-10-CM

## 2024-02-13 ENCOUNTER — LAB REQUISITION (OUTPATIENT)
Dept: LAB | Facility: CLINIC | Age: 89
End: 2024-02-13
Payer: COMMERCIAL

## 2024-02-13 DIAGNOSIS — I10 ESSENTIAL (PRIMARY) HYPERTENSION: ICD-10-CM

## 2024-02-14 LAB
ANION GAP SERPL CALCULATED.3IONS-SCNC: 9 MMOL/L (ref 7–15)
BUN SERPL-MCNC: 20.4 MG/DL (ref 8–23)
CALCIUM SERPL-MCNC: 9.2 MG/DL (ref 8.8–10.2)
CHLORIDE SERPL-SCNC: 103 MMOL/L (ref 98–107)
CREAT SERPL-MCNC: 1.08 MG/DL (ref 0.67–1.17)
DEPRECATED HCO3 PLAS-SCNC: 24 MMOL/L (ref 22–29)
EGFRCR SERPLBLD CKD-EPI 2021: 67 ML/MIN/1.73M2
ERYTHROCYTE [DISTWIDTH] IN BLOOD BY AUTOMATED COUNT: 13.2 % (ref 10–15)
GLUCOSE SERPL-MCNC: 89 MG/DL (ref 70–99)
HCT VFR BLD AUTO: 39.2 % (ref 40–53)
HGB BLD-MCNC: 13 G/DL (ref 13.3–17.7)
MCH RBC QN AUTO: 31.9 PG (ref 26.5–33)
MCHC RBC AUTO-ENTMCNC: 33.2 G/DL (ref 31.5–36.5)
MCV RBC AUTO: 96 FL (ref 78–100)
PLATELET # BLD AUTO: 268 10E3/UL (ref 150–450)
POTASSIUM SERPL-SCNC: 5 MMOL/L (ref 3.4–5.3)
RBC # BLD AUTO: 4.08 10E6/UL (ref 4.4–5.9)
SODIUM SERPL-SCNC: 136 MMOL/L (ref 135–145)
WBC # BLD AUTO: 5.2 10E3/UL (ref 4–11)

## 2024-02-14 PROCEDURE — 36415 COLL VENOUS BLD VENIPUNCTURE: CPT | Mod: ORL | Performed by: NURSE PRACTITIONER

## 2024-02-14 PROCEDURE — P9604 ONE-WAY ALLOW PRORATED TRIP: HCPCS | Mod: ORL | Performed by: NURSE PRACTITIONER

## 2024-02-14 PROCEDURE — 80048 BASIC METABOLIC PNL TOTAL CA: CPT | Mod: ORL | Performed by: NURSE PRACTITIONER

## 2024-02-14 PROCEDURE — 85027 COMPLETE CBC AUTOMATED: CPT | Mod: ORL | Performed by: NURSE PRACTITIONER

## 2024-07-09 ENCOUNTER — LAB REQUISITION (OUTPATIENT)
Dept: LAB | Facility: CLINIC | Age: 89
End: 2024-07-09
Payer: COMMERCIAL

## 2024-07-09 DIAGNOSIS — I10 ESSENTIAL (PRIMARY) HYPERTENSION: ICD-10-CM

## 2024-07-09 DIAGNOSIS — R30.0 DYSURIA: ICD-10-CM

## 2024-07-09 LAB
ALBUMIN UR-MCNC: 30 MG/DL
APPEARANCE UR: ABNORMAL
BACTERIA #/AREA URNS HPF: ABNORMAL /HPF
BILIRUB UR QL STRIP: NEGATIVE
COLOR UR AUTO: ABNORMAL
GLUCOSE UR STRIP-MCNC: NEGATIVE MG/DL
HGB UR QL STRIP: NEGATIVE
KETONES UR STRIP-MCNC: NEGATIVE MG/DL
LEUKOCYTE ESTERASE UR QL STRIP: ABNORMAL
MUCOUS THREADS #/AREA URNS LPF: PRESENT /LPF
NITRATE UR QL: POSITIVE
PH UR STRIP: 6 [PH] (ref 5–7)
RBC URINE: 3 /HPF
SP GR UR STRIP: 1.02 (ref 1–1.03)
UROBILINOGEN UR STRIP-MCNC: NORMAL MG/DL
WBC CLUMPS #/AREA URNS HPF: PRESENT /HPF
WBC URINE: >182 /HPF

## 2024-07-09 PROCEDURE — 87086 URINE CULTURE/COLONY COUNT: CPT | Mod: ORL | Performed by: NURSE PRACTITIONER

## 2024-07-09 PROCEDURE — 87186 SC STD MICRODIL/AGAR DIL: CPT | Mod: ORL | Performed by: NURSE PRACTITIONER

## 2024-07-09 PROCEDURE — 81001 URINALYSIS AUTO W/SCOPE: CPT | Mod: ORL | Performed by: NURSE PRACTITIONER

## 2024-07-10 LAB
ANION GAP SERPL CALCULATED.3IONS-SCNC: 8 MMOL/L (ref 7–15)
BACTERIA UR CULT: ABNORMAL
BUN SERPL-MCNC: 39.4 MG/DL (ref 8–23)
CALCIUM SERPL-MCNC: 9.3 MG/DL (ref 8.8–10.2)
CHLORIDE SERPL-SCNC: 106 MMOL/L (ref 98–107)
CREAT SERPL-MCNC: 1.09 MG/DL (ref 0.67–1.17)
DEPRECATED HCO3 PLAS-SCNC: 25 MMOL/L (ref 22–29)
EGFRCR SERPLBLD CKD-EPI 2021: 66 ML/MIN/1.73M2
ERYTHROCYTE [DISTWIDTH] IN BLOOD BY AUTOMATED COUNT: 13 % (ref 10–15)
GLUCOSE SERPL-MCNC: 102 MG/DL (ref 70–99)
HCT VFR BLD AUTO: 37.4 % (ref 40–53)
HGB BLD-MCNC: 12.6 G/DL (ref 13.3–17.7)
MCH RBC QN AUTO: 32.4 PG (ref 26.5–33)
MCHC RBC AUTO-ENTMCNC: 33.7 G/DL (ref 31.5–36.5)
MCV RBC AUTO: 96 FL (ref 78–100)
PLATELET # BLD AUTO: 271 10E3/UL (ref 150–450)
POTASSIUM SERPL-SCNC: 4.9 MMOL/L (ref 3.4–5.3)
RBC # BLD AUTO: 3.89 10E6/UL (ref 4.4–5.9)
SODIUM SERPL-SCNC: 139 MMOL/L (ref 135–145)
WBC # BLD AUTO: 6.7 10E3/UL (ref 4–11)

## 2024-07-10 PROCEDURE — P9604 ONE-WAY ALLOW PRORATED TRIP: HCPCS | Mod: ORL | Performed by: NURSE PRACTITIONER

## 2024-07-10 PROCEDURE — 80048 BASIC METABOLIC PNL TOTAL CA: CPT | Mod: ORL | Performed by: NURSE PRACTITIONER

## 2024-07-10 PROCEDURE — 85027 COMPLETE CBC AUTOMATED: CPT | Mod: ORL | Performed by: NURSE PRACTITIONER

## 2024-07-10 PROCEDURE — 36415 COLL VENOUS BLD VENIPUNCTURE: CPT | Mod: ORL | Performed by: NURSE PRACTITIONER

## 2024-08-14 NOTE — ED PROVIDER NOTES
History     Chief Complaint:  Emesis    HPI   Sudeep Field is a 83 year old male with a history of GERD, prostate cancer, aortic aneurysm, who presents with his daughter to the emergency department with concern for emesis. The patient's daughter reports that the patient began having episodes of emesis two hours ago, and called her to tell her that he did not feel well. She went to where he was, relaxing at his son's home, and offered him some food. After eating this food, he had another episode of emesis with possible hematemesis as red streaks were noticed. They contacted their primary care provider and were instructed to present here. The patient denies any chest pain, abdominal pain, or diarrhea. His son, who he lives with, had nausea over the last week.     Allergies:  NKDA    Medications:    Losartan  Aspirin    Past Medical History:    Aortic aneurysm  GERD  HTN  IBS  Dementia  Prostate cancer  Sleep apnea    Past Surgical History:    Herniorrhaphy  Prostatectomy    Family History:    No past pertinent family history.    Social History:  The patient denies tobacco or alcohol use.    Review of Systems   Cardiovascular: Negative for chest pain.   Gastrointestinal: Positive for nausea and vomiting. Negative for abdominal pain and diarrhea.   All other systems reviewed and are negative.      Physical Exam     Patient Vitals for the past 24 hrs:   BP Temp Temp src Pulse Heart Rate Resp SpO2   02/02/19 2100 128/67 -- -- 59 59 -- 99 %   02/02/19 2045 132/70 -- -- 63 57 -- 98 %   02/02/19 2030 134/67 -- -- 59 60 -- 100 %   02/02/19 2015 129/64 -- -- 53 54 -- 97 %   02/02/19 2007 -- -- -- -- 55 -- 97 %   02/02/19 1915 159/83 -- -- -- -- -- --   02/02/19 1911 -- 98  F (36.7  C) Oral 67 67 20 97 %       Physical Exam  General: Alert, interactive. GCS 15. No distress.   Head:  Scalp is atraumatic.  Eyes:  EOM intact. The pupils are equal, round, and reactive to light. No scleral icterus.  ENT:                             [facility] Hospitalist Progress Note    Subjective   Patient seen and examined. Somnolent this morning, had some agitation yesterday. No other events reported.     Objective     I/O's    Intake/Output Summary (Last 24 hours) at 8/14/2024 1240  Last data filed at 8/14/2024 0600  Gross per 24 hour   Intake 240 ml   Output --   Net 240 ml         ALLERGIES:  Diphenhydramine hcl     Hospital Meds  Current Facility-Administered Medications   Medication Dose Route Frequency Provider Last Rate Last Admin    QUEtiapine (SEROquel) tablet 25 mg  25 mg Oral 2 times per day Richardson Rodriguez DO        zinc oxide 20 % ointment   Topical BID Rachel Cha DO   Given at 08/14/24 0856    sodium chloride 0.9 % flush bag 25 mL  25 mL Intravenous PRN Gino Abad MD        sodium chloride 0.9 % injection 2 mL  2 mL Intracatheter 2 times per day Gino Abad MD   2 mL at 08/14/24 0856    sodium chloride (NORMAL SALINE) 0.9 % bolus 500 mL  500 mL Intravenous PRN Gino Abad MD        acetaminophen (TYLENOL) tablet 650 mg  650 mg Oral Q4H PRN Marisol Sierra MD   650 mg at 08/13/24 2238    bisacodyl (DULCOLAX) suppository 10 mg  10 mg Rectal Daily PRN Marisol Sierra MD        albuterol inhaler 2 puff  2 puff Inhalation Q4H Resp PRN Marisol Sierra MD        calcium carbonate (TUMS) chewable tablet 500 mg  500 mg Oral Q4H PRN Marisol Sierra MD        docusate sodium-sennosides (SENOKOT S) 50-8.6 MG 1 tablet  1 tablet Oral BID PRN Marisol Sierra MD        guaiFENesin-DM (ROBITUSSIN DM) 100-10 MG/5ML syrup 10 mL  10 mL Oral Q4H PRN Marisol Sierra MD        melatonin tablet 3 mg  3 mg Oral Nightly PRN Marisol Sierra MD   3 mg at 08/13/24 2238    ondansetron (ZOFRAN) injection 4 mg  4 mg Intravenous Q6H PRN Marisol Sierra MD        simethicone (MYLICON) tablet 125 mg  125 mg Oral 4x Daily PRN Marisol Sierra MD        furosemide (LASIX) tablet 20 mg  20 mg Oral Every Other Day Marisol Sierra MD   20 mg at 08/13/24 0938               Ears:  The external ears are normal.  Nose:  The external nose is normal.  Throat:  The oropharynx is normal. Mucus membranes are dry.                 Neck:  Normal range of motion. There is no rigidity.   CV:  Regular rate and rhythm. No murmur. 2+ radial pulses  Resp:  Breath sounds are clear bilaterally. Non-labored, no retractions or accessory muscle use.  GI:  Abdomen is distended (improved from baseline per daughter), no tenderness, rebound, or guarding.   MS:  Normal range of motion.   Skin:  Warm and dry.   Neuro:  Strength and sensation grossly intact.   Psych:  Awake. Alert.  Appropriate interactions.     Emergency Department Course     Laboratory:    CBC: WBC: 7.9, HGB: 16.0, PLT: 243  BMP: Glucose 116, Calcium: 8.1, o/w WNL (Creatinine: 0.97)    1919 Troponin: <0.015    Interventions:  1933 NS 500L IV   Zofran, 4 mg, IV injection      Emergency Department Course:  Nursing notes and vitals reviewed. (1921) I performed an exam of the patient as documented above.     IV inserted. Medicine administered as documented above. Blood drawn. This was sent to the lab for further testing, results above.    (2053) I rechecked the patient and discussed the results of his workup thus far.     Findings and plan explained to the Patient and daughter. Patient discharged home with instructions regarding supportive care, medications, and reasons to return. The importance of close follow-up was reviewed. The patient was prescribed Zofran.     I personally reviewed the laboratory results with the Patient and daughter and answered all related questions prior to discharge.     Impression & Plan      Medical Decision Making:  Sudeep Field is a 83 year old male with medical history including aortic aneurysm, GERD, dementia, and hypertension who presents for evaluation of nausea and vomiting with a nonfocal abdominal exam. I considered a broad differential diagnosis for this patient including viral gastroenteritis, food  LORazepam (ATIVAN) tablet 0.5 mg  0.5 mg Oral Q6H PRN Marisol Sierra MD   0.5 mg at 08/13/24 2238    sertraline (ZOLOFT) tablet 125 mg  125 mg Oral Daily Marisol Sierra MD   125 mg at 08/14/24 0855    traZODone (DESYREL) tablet 50 mg  50 mg Oral Nightly Marisol Sierra MD   50 mg at 08/13/24 2013    [Held by provider] heparin (porcine) injection 5,000 Units  5,000 Units Subcutaneous 3 times per day Marisol Sierra MD            Last Recorded Vitals  Visit Vitals  BP (!) 148/73   Pulse (!) 56   Temp 97.3 °F (36.3 °C) (Axillary)   Resp 18   Ht 5' 5\" (1.651 m)   Wt 66.2 kg (145 lb 15.1 oz)   SpO2 97%   BMI 24.29 kg/m²         SpO2 Readings from Last 3 Encounters:   08/14/24 97%   06/30/24 98%   05/18/24 99%        Physical Exam  Vitals reviewed.   Constitutional:       General: She is not in acute distress.  Cardiovascular:      Rate and Rhythm: Normal rate and regular rhythm.   Pulmonary:      Effort: Pulmonary effort is normal. No respiratory distress.      Breath sounds: Normal breath sounds.   Abdominal:      General: Bowel sounds are normal. There is no distension.      Palpations: Abdomen is soft.      Tenderness: There is no abdominal tenderness.   Musculoskeletal:      Right lower leg: No edema.      Left lower leg: No edema.   Neurological:      Mental Status: She is alert.         Labs     Recent Labs   Lab 08/14/24  1033 08/13/24  0436 08/12/24  1204   WBC 9.6 10.6 9.7   RBC 4.95 4.37 4.94   HGB 13.9 12.6 14.1   HCT 44.6 39.2 44.3   MCV 90.1 89.7 89.7    244 282     Recent Labs   Lab 08/13/24  0436 08/12/24  1204   SODIUM 139 140   POTASSIUM 3.8 4.6   CHLORIDE 109 108   CO2 26 29   BUN 18 23*   CREATININE 0.74 0.95   GLUCOSE 100* 114*   CALCIUM 9.3 9.5   MG 1.9 1.9   PHOS 2.7  --        Recent Labs   Lab 08/13/24  0436 08/12/24  1204   AST 21 19   BILIRUBIN 0.8 0.4   TOTPROTEIN 6.3* 6.9   ALBUMIN 3.4* 3.7         Imaging    XR CHEST PA OR AP 1 VIEW   Final Result   1.   No acute cardiopulmonary process.             Electronically Signed by: AMIRA RANGEL M.D.    Signed on: 8/12/2024 2:45 PM    Workstation ID: [id number]      CT HEAD WO CONTRAST   Final Result   1. There are no acute intracranial abnormalities. Age-appropriate cerebral   and cerebellar atrophy is noted with chronic supratentorial white matter   ischemic demyelination from small vessel atherosclerotic disease.      Electronically Signed by: STANFORD HEATON M.D.    Signed on: 8/12/2024 2:42 PM    Workstation ID: [id number]          Cultures  Microbiology Results       None             Assessment & Plan          Recurrent syncope: Seated syncope x3 in the past few months. Telemetry unrevealing, patient had holter x2 but not able to keep in place due to mental status  -Cardiology/EP following, input appreciated  -Continue telemetry monitoring  -Implantable loop recorder placement- today vs tomorrow    Bright red blood per rectum , acute: Improving/resolving.  Uncertain etiology  -GI input appreciated  -Daily CBC    Hypertensive heart disease with chronic diastolic CHF: Compensated, euvolemic  -Continue to monitor BP  -Continue furosemide every other day    Dementia: Stable  -Limit sedating meds  -Reorient as needed                  Nutrition status: Does Not Meet Criteria for Malnutrition       Urinary Catheter and Central Lines (listed below if present)        DVT Prophylaxis  Subcu heparin- on hold for loop recorder placement    DISPOSITION:  To subacute rehab after loop recorder placement         Richardson Rodriguez DO [facility] Hospitalist       poisoning, bowel obstruction, intra-abdominal infection such as colitis, cholecystitis, UTI, pyelonephritis, appendicitis, etc.  There are no signs of worrisome intra-abdominal pathologies detected during the visit today.  Lab work including CBC, BMP, troponin negative.  Screening EKG showed normal sinus rhythm without ischemic changes.  Troponin negative.  I doubt acute coronary syndrome given these results along with no chest pain or shortness of breath.     The patient has a completely benign initial and repeat abdominal exam without rebound, guarding, or marked tenderness to palpation.  Daughter at bedside noted an episode in which there was, what appeared to be bright red blood, in the toilet.  This occurred after an episode of violent vomiting and a suspect Tiffani-Muhammad tear.  Discussed this with patient's daughter and she agrees that given there was an isolated incident and the patient has had no further vomiting here, hospitalization is not required. HGB stable.   Patient remained remained hemodynamically stable in the emergency department.  He was given 500 L of fluid along with Zofran and drink 8 ounces of fluid with no further vomiting.  Discussed with patient's daughter strict return precautions and all questions/concerns addressed prior to discharge.      Diagnosis:    ICD-10-CM    1. Nausea with vomiting R11.2        Disposition:  discharged to home    Discharge Medications:     Medication List      Started    ondansetron 4 MG ODT tab  Commonly known as:  ZOFRAN ODT  4 mg, Oral, EVERY 8 HOURS PRN          Scribe Disclosure:  Jonathan SCRUGGS, am serving as a scribe on 2/2/2019 at 7:16 PM to personally document services performed by Senia Ochoa PA-C based on my observations and the provider's statements to me.     Jonathan Baum  2/2/2019   [facility] EMERGENCY DEPARTMENT       Senia Ochoa PA-C  02/03/19 0133

## 2024-11-19 ENCOUNTER — LAB REQUISITION (OUTPATIENT)
Dept: LAB | Facility: CLINIC | Age: 89
End: 2024-11-19
Payer: COMMERCIAL

## 2024-11-19 DIAGNOSIS — I10 ESSENTIAL (PRIMARY) HYPERTENSION: ICD-10-CM

## 2024-11-20 LAB
ANION GAP SERPL CALCULATED.3IONS-SCNC: 9 MMOL/L (ref 7–15)
BUN SERPL-MCNC: 23.3 MG/DL (ref 8–23)
CALCIUM SERPL-MCNC: 8.9 MG/DL (ref 8.8–10.4)
CHLORIDE SERPL-SCNC: 101 MMOL/L (ref 98–107)
CREAT SERPL-MCNC: 1.1 MG/DL (ref 0.67–1.17)
EGFRCR SERPLBLD CKD-EPI 2021: 65 ML/MIN/1.73M2
ERYTHROCYTE [DISTWIDTH] IN BLOOD BY AUTOMATED COUNT: 12.7 % (ref 10–15)
GLUCOSE SERPL-MCNC: 95 MG/DL (ref 70–99)
HCO3 SERPL-SCNC: 25 MMOL/L (ref 22–29)
HCT VFR BLD AUTO: 35.3 % (ref 40–53)
HGB BLD-MCNC: 11.4 G/DL (ref 13.3–17.7)
MCH RBC QN AUTO: 31.2 PG (ref 26.5–33)
MCHC RBC AUTO-ENTMCNC: 32.3 G/DL (ref 31.5–36.5)
MCV RBC AUTO: 97 FL (ref 78–100)
PLATELET # BLD AUTO: 252 10E3/UL (ref 150–450)
POTASSIUM SERPL-SCNC: 4.4 MMOL/L (ref 3.4–5.3)
RBC # BLD AUTO: 3.65 10E6/UL (ref 4.4–5.9)
SODIUM SERPL-SCNC: 135 MMOL/L (ref 135–145)
WBC # BLD AUTO: 6.1 10E3/UL (ref 4–11)

## 2024-11-20 PROCEDURE — 80048 BASIC METABOLIC PNL TOTAL CA: CPT | Mod: ORL | Performed by: NURSE PRACTITIONER

## 2024-11-20 PROCEDURE — P9603 ONE-WAY ALLOW PRORATED MILES: HCPCS | Mod: ORL | Performed by: NURSE PRACTITIONER

## 2024-11-20 PROCEDURE — 85027 COMPLETE CBC AUTOMATED: CPT | Mod: ORL | Performed by: NURSE PRACTITIONER

## 2024-11-20 PROCEDURE — 36415 COLL VENOUS BLD VENIPUNCTURE: CPT | Mod: ORL | Performed by: NURSE PRACTITIONER
